# Patient Record
Sex: MALE | Race: WHITE | NOT HISPANIC OR LATINO | Employment: FULL TIME | URBAN - METROPOLITAN AREA
[De-identification: names, ages, dates, MRNs, and addresses within clinical notes are randomized per-mention and may not be internally consistent; named-entity substitution may affect disease eponyms.]

---

## 2017-05-11 ENCOUNTER — OFFICE VISIT (OUTPATIENT)
Dept: FAMILY MEDICINE | Facility: CLINIC | Age: 47
End: 2017-05-11
Payer: COMMERCIAL

## 2017-05-11 ENCOUNTER — RADIANT APPOINTMENT (OUTPATIENT)
Dept: GENERAL RADIOLOGY | Facility: CLINIC | Age: 47
End: 2017-05-11
Attending: NURSE PRACTITIONER
Payer: COMMERCIAL

## 2017-05-11 VITALS
OXYGEN SATURATION: 96 % | HEART RATE: 84 BPM | DIASTOLIC BLOOD PRESSURE: 73 MMHG | TEMPERATURE: 97.6 F | SYSTOLIC BLOOD PRESSURE: 108 MMHG | BODY MASS INDEX: 27.7 KG/M2 | WEIGHT: 209 LBS | HEIGHT: 73 IN

## 2017-05-11 DIAGNOSIS — M77.01 MEDIAL EPICONDYLITIS OF RIGHT ELBOW: ICD-10-CM

## 2017-05-11 DIAGNOSIS — M75.101 ROTATOR CUFF SYNDROME, RIGHT: Primary | ICD-10-CM

## 2017-05-11 PROCEDURE — 99203 OFFICE O/P NEW LOW 30 MIN: CPT | Performed by: NURSE PRACTITIONER

## 2017-05-11 PROCEDURE — 73030 X-RAY EXAM OF SHOULDER: CPT | Mod: RT

## 2017-05-11 RX ORDER — NAPROXEN 500 MG/1
500 TABLET ORAL 2 TIMES DAILY PRN
Qty: 30 TABLET | Refills: 1 | Status: SHIPPED | OUTPATIENT
Start: 2017-05-11 | End: 2017-07-07

## 2017-05-11 NOTE — LETTER
[address]  [place] [address]  [phone]                                                                                               Date: 5/12/2017    Rajiv Feng                                                                               [address]  [place] [address]              Dear Rajiv,    It looks on xray like a perfectly healthy shoulder   Enclosed is a copy of your results.      It was a pleasure to see you at your last appointment. If you have any questions, please feel free to call myself or my nurse at [phone].          Sincerely,    Mary Parish NP/ Irasema CHANG, CMA

## 2017-05-11 NOTE — MR AVS SNAPSHOT
After Visit Summary   5/11/2017    Rajiv Feng    MRN: [id number]           Patient Information     Date Of Birth          1970        Visit Information        Provider Department      5/11/2017 3:00 PM Mary Parish APRN CNP [facility] [place]        Today's Diagnoses     Rotator cuff syndrome, right    -  1    Medial epicondylitis of right elbow          Care Instructions    Begin naproxen 500mg twice a day with food for shoulder and elbow  Wear tennis elbow splint during the day  Rest elbow  Ice if painful    Schedule physical therapy for the right rotator cuff strain             Follow-ups after your visit        Additional Services     [facility] PT, HAND, AND CHIROPRACTIC REFERRAL       **This order will print in the [facility] Scheduling Office**    Physical Therapy, Hand Therapy and Chiropractic Care are available through:    *[facility]  *[facility]  *[facility]    Call one number to schedule at any of the above locations: [phone].    Your provider has referred you to: Physical Therapy at [facility] or [facility]    Indication/Reason for Referral: Shoulder Pain  Onset of Illness: 3 weeks  Therapy Orders: Evaluate and Treat  Special Programs: None  Special Request: None    Rinku Dobbs      Additional Comments for the Therapist or Chiropractor:     Please be aware that coverage of these services is subject to the terms and limitations of your health insurance plan.  Call member services at your health plan with any benefit or coverage questions.      Please bring the following to your appointment:    *Your personal calendar for scheduling future appointments  *Comfortable clothing                  Who to contact     If you have questions or need follow up information about today's clinic visit or your schedule please contact [facility] directly at [phone].  Normal or non-critical lab and imaging results will be communicated to  "you by MyChart, letter or phone within 4 business days after the clinic has received the results. If you do not hear from us within 7 days, please contact the clinic through [employer] or phone. If you have a critical or abnormal lab result, we will notify you by phone as soon as possible.  Submit refill requests through [employer] or call your pharmacy and they will forward the refill request to us. Please allow 3 business days for your refill to be completed.          Additional Information About Your Visit        [employer] Information     [employer] lets you send messages to your doctor, view your test results, renew your prescriptions, schedule appointments and more. To sign up, go to [email or web address] . Click on \"Log in\" on the left side of the screen, which will take you to the Welcome page. Then click on \"Sign up Now\" on the right side of the page.     You will be asked to enter the access code listed below, as well as some personal information. Please follow the directions to create your username and password.     Your access code is: [email or web address]  Expires: 2017  3:26 PM     Your access code will  in 90 days. If you need help or a new code, please call your [facility] clinic or [phone].        Care EveryWhere ID     This is your Care EveryWhere ID. This could be used by other organizations to access your [facility] medical records  [id number]        Your Vitals Were     Pulse Temperature Height Pulse Oximetry BMI (Body Mass Index)       84 97.6  F (36.4  C) (Oral) 6' 0.5\" (1.842 m) 96% 27.96 kg/m2        Blood Pressure from Last 3 Encounters:   17 108/73    Weight from Last 3 Encounters:   17 209 lb (94.8 kg)              We Performed the Following     [facility] PT, HAND, AND CHIROPRACTIC REFERRAL          Today's Medication Changes          These changes are accurate as of: 17  3:26 PM.  If you have any questions, ask your nurse or doctor.               Start taking these medicines.     "    Dose/Directions    naproxen 500 MG tablet   Commonly known as:  NAPROSYN   Used for:  Rotator cuff syndrome, right   Started by:  Mary Parish APRN CNP        Dose:  500 mg   Take 1 tablet (500 mg) by mouth 2 times daily as needed for moderate pain   Quantity:  30 tablet   Refills:  1            Where to get your medicines      These medications were sent to [id number] - [place], [place] - [address]  [address], [place] [address]     Phone:  [phone]     naproxen 500 MG tablet                Primary Care Provider    None Specified       No primary provider on file.        Thank you!     Thank you for choosing [facility]  for your care. Our goal is always to provide you with excellent care. Hearing back from our patients is one way we can continue to improve our services. Please take a few minutes to complete the written survey that you may receive in the mail after your visit with us. Thank you!             Your Updated Medication List - Protect others around you: Learn how to safely use, store and throw away your medicines at www.disposemymeds.org.          This list is accurate as of: 5/11/17  3:26 PM.  Always use your most recent med list.                   Brand Name Dispense Instructions for use    FINASTERIDE PO          naproxen 500 MG tablet    NAPROSYN    30 tablet    Take 1 tablet (500 mg) by mouth 2 times daily as needed for moderate pain

## 2017-05-11 NOTE — NURSING NOTE
"Chief Complaint   Patient presents with     Shoulder Pain       Initial /73 (BP Location: Right arm, Cuff Size: Adult Large)  Pulse 84  Temp 97.6  F (36.4  C) (Oral)  Ht 6' 0.5\" (1.842 m)  Wt 209 lb (94.8 kg)  SpO2 96%  BMI 27.96 kg/m2 Estimated body mass index is 27.96 kg/(m^2) as calculated from the following:    Height as of this encounter: 6' 0.5\" (1.842 m).    Weight as of this encounter: 209 lb (94.8 kg).  Medication Reconciliation: complete     Jailyn Virgen MA    "

## 2017-05-11 NOTE — PROGRESS NOTES
SUBJECTIVE:                                                    Rajiv Feng is a 46 year old male who presents to clinic today for the following health issues:      Musculoskeletal problem/pain      Duration: 3 weeks of right shoulder pain with certain overhead movements, no known trauma, has seen chiro who dx soft tissue injury and athletic taped shoulder. Description  Location: right shoulder and elbow    Intensity:  moderate    Accompanying signs and symptoms: none    History  Previous similar problem: no   Previous evaluation:  none    Precipitating or alleviating factors:  Trauma or overuse: YES- uses mouse a lot; started working out about 6 weeks ago    Aggravating factors include: lifting and rolling over in bed    Therapies tried and outcome: support wrap     Also reports right medial elbow pain with lifting heavy objects    Problem list and histories reviewed & adjusted, as indicated.  Additional history: as documented    There is no problem list on file for this patient.    History reviewed. No pertinent surgical history.    Social History   Substance Use Topics     Smoking status: Never Smoker     Smokeless tobacco: Never Used     Alcohol use No     History reviewed. No pertinent family history.      Current Outpatient Prescriptions   Medication Sig Dispense Refill     FINASTERIDE PO        naproxen (NAPROSYN) 500 MG tablet Take 1 tablet (500 mg) by mouth 2 times daily as needed for moderate pain 30 tablet 1     Allergies   Allergen Reactions     Broccoli [Brassica Oleracea Italica] Anaphylaxis     Penicillin G Anaphylaxis       Reviewed and updated as needed this visit by clinical staff       Reviewed and updated as needed this visit by Provider         ROS:  Constitutional, HEENT, cardiovascular, pulmonary, gi and gu systems are negative, except as otherwise noted.    OBJECTIVE:                                                    /73 (BP Location: Right arm, Cuff Size: Adult Large)  Pulse 84   "Temp 97.6  F (36.4  C) (Oral)  Ht 6' 0.5\" (1.842 m)  Wt 209 lb (94.8 kg)  SpO2 96%  BMI 27.96 kg/m2  Body mass index is 27.96 kg/(m^2).  GENERAL: healthy, alert and no distress  EYES: Eyes grossly normal to inspection, PERRL and conjunctivae and sclerae normal  NECK: no adenopathy, no asymmetry, masses, or scars and thyroid normal to palpation  MS: no gross musculoskeletal defects noted, no edema, no limitation to active ROM of shoulder, minimal pain of posterior AC with internal rotation , negative empty can and scarf signs, no impingement, good symmetric strength of arms, no tenderness of medial elbow at this time     BACK: no CVA tenderness, no paralumbar tenderness  PSYCH: mentation appears normal, affect normal/bright    Diagnostic Test Results:  Xray right shoulder     ASSESSMENT/PLAN:                                                          ICD-10-CM    1. Rotator cuff syndrome, right M75.101 naproxen (NAPROSYN) 500 MG tablet     [facility] PT, HAND, AND CHIROPRACTIC REFERRAL   2. Medial epicondylitis of right elbow M77.01        Patient Instructions   Begin naproxen 500mg twice a day with food for shoulder and elbow  Wear tennis elbow splint during the day  Rest elbow  Ice if painful    Schedule physical therapy for the right rotator cuff strain           YOGESH [facility]  "

## 2017-05-11 NOTE — PATIENT INSTRUCTIONS
Begin naproxen 500mg twice a day with food for shoulder and elbow  Wear tennis elbow splint during the day  Rest elbow  Ice if painful    Schedule physical therapy for the right rotator cuff strain

## 2017-05-15 ENCOUNTER — THERAPY VISIT (OUTPATIENT)
Dept: PHYSICAL THERAPY | Facility: CLINIC | Age: 47
End: 2017-05-15
Payer: COMMERCIAL

## 2017-05-15 DIAGNOSIS — M25.511 ACUTE PAIN OF RIGHT SHOULDER: Primary | ICD-10-CM

## 2017-05-15 DIAGNOSIS — M75.101 ROTATOR CUFF SYNDROME, RIGHT: ICD-10-CM

## 2017-05-15 PROCEDURE — 97110 THERAPEUTIC EXERCISES: CPT | Mod: GP | Performed by: PHYSICAL THERAPIST

## 2017-05-15 PROCEDURE — 97112 NEUROMUSCULAR REEDUCATION: CPT | Mod: GP | Performed by: PHYSICAL THERAPIST

## 2017-05-15 PROCEDURE — 97161 PT EVAL LOW COMPLEX 20 MIN: CPT | Mod: GP | Performed by: PHYSICAL THERAPIST

## 2017-05-15 NOTE — PROGRESS NOTES
Subjective:    HPI                    Objective:    System    Physical Exam    [facility]  [facility] Initial Evaluation - Upper Extremity    Evaluation Date: May 15, 2017  Rajiv Feng is a 46 year old male with a Rt shld condition.   Referral: GP  Employment:    Employment status: normal hours  Work Mechanical Stresses: Driving, sitting, computer  Leisure Mechanical Stresses: 2-3 x/week, wt lifting running/elliptical and swimming  Functional disability from present episode: sleeping on Rt, carrying, lifting and reaching overhead  Functional disability score (SPADI): 30  Visual Analog Score (VAS 0-10): ranges 0-8/10, 1-2/10 currently    HISTORY:  Pt presents with:  Int pain sharp pain ant/post Rt shld and Int achy/sharp Rt medial elbow  (Constant/Intermittent, Dull/achy/sharp/stabbing/throbbing and location/Radiation)  Associated symptoms (Numbness/tingling/weakness/swelling/catching/clicking/locking/subluxing):  denies N&T, occas clicking/popping in rt shld  Onset of symptoms (date/how):  4 weeks ago - JUAN for onset  Symptoms Status (new/recurrent/chronic): new and (improving/not changing/worsening): slight better  Condition occurred in the following environment: unknown   Symptoms at onset: Shld pain    (with consideration for bending, sitting, turning neck, dressing, reaching, gripping, am, as the day progresses, pm, when still, on the move, sleeping: prone, sup, side R/L, other )  Symptoms are made worse with:  Lying on Rt shld, lifting heavier objects, reaching overhead and  Back, resting w/ pressure on elbow, Occas with computer work, occas w/ prolonged sitting at home,    Symptoms are made better with:  Change position or stop activity    Continued use makes pain:  unchanged; Sleep disturbance: wakes 1 x/night ;   Pain at rest:  occas: Site:  Rt shld    Previous episodes: not for shld,   Previous treatments: chiro taped shld  Improvement with previous treatments: some relief      Specific Questions: (as reported by the patient)  Do you have pain with coughing/sneezing: no  Is gait and coordination of upper extremities normal or abnormal: normal  Patient describes his/her general health as: good  Imaging/special testing: x-ray - normal  Recent or major surgery includes the following: wisdom teeth  Do you have night pain: pain wakes him but he can repostition and go back to sleep  Have you had any recent accidents: no  Have you experienced any unexplained weight loss: no  Pertinent medical history includes: Sleep Disorder/apnea  Medical allergies include: PCN  Current medications: anti inflammatory  Barriers at home: none  Other red flags: none    OBJECTIVE EXAMINATION:  Rt shld (site)    POSTURE:  Sitting: poor  Correction of Posture: slight decr but did not abolish  Standing Posture: Good    NEUROLOGICAL (motor/sensory/reflex/dural; if applicable):    Myotomes:  See resisted movements below    BASELINES: (pain or functional activity): ERP Rt shld flex and Abd - Active and passive.      EXTREMITIES: (Shoulder / Elbow / Wrist / Hand): Rt shld   Rt AROM/Pain Lt AROM/Pain Rt PROM w/ OP/Pain Lt PROM w/ w/o OP/Pain   Shld Flexion WNL/PDM  WNL/ERP            Extension WNL/PDM  WNL/ERP            Abduction WNL/ERP  WNL/ERP            Adduction               IR WNL/PDM  WNL            ER WNL  WNL/ERP    Elbow Flexion WNL  WNL               Extension WNL  WNL    Wrist Flexion                Extension                Pronation                Supination                UD                RD           Resisted Test Response (pain):    Rt Pain Lt Pain   Shld Flexion Strong              Extension Strong              Abduction Strong              Adduction Strong + shld             IR Strong + shld             ER Strong      Elbow Flexion Strong + elbow                Extension Strong      Wrist Flexion Strong               Extension Strong               Pronation Strong               Supination Strong     "  Finger Extension Strong                        Other Tests:   SPINE  Cervical AROM:  Movement Loss % Loss  Pain   Protrusion 0%    Flexion 0%    Retraction 25% Post shd   Extension 50% Post shld   Lateral flexion R 0%    Lateral flexion L 0%    Rotation R 0%    Rotation L 0%    Effect of repeated movements:    Seated Cerv Retraction x10 - NE, NE, NE   Seated Cerv Retraction w/ OP x10 - NE, NE,NE   Seated Cerv Extension x10 - NE, NE, Decr pain w/ shld ROM     Effect of static positioning:    Sustained Cerv Extension in VIRY 30\" x3 and 60\" x1  - NE, NE, decr pain w/ Shld AROM  Spine testing (relevant/not relevant/secondary problem): Relevant ???    Baseline Symptoms: NA - will r/o neck then reassess shld  Repeated Tests Symptom Response Mechanical Response   Active/Passive movement, resisted test, functional test During - Produce, Abolish, Increase, Decrease, NE After - Better, Worse, NB, NW, NE Effect - ? or ? ROM, strength or key functional test No Effect                               Effect of static positioning                  Provisional Classification: Cerv Derangement:  Unilat, asymmetrical above elbow Extremity or Spine: Spine  Principle of Management (education/equipment/mechanical therapy/specific principle): Posture correction:  Neutral spine and use of L-roll   Extension:  Seated cervical ext 6-10x 4-5 x/day and Sustained ext VIRY 30-60\" 2-3 x/day 2-4x    Assessment/Plan:      Patient is a 46 year old male with right side shoulder complaints.    Patient has the following significant findings with corresponding treatment plan.                Diagnosis 1:  Rt shld pain/RC Syndrome  Pain -  hot/cold therapy, manual therapy, education, directional preference exercise and home program  Decreased ROM/flexibility - manual therapy, therapeutic exercise and home program  Decreased function - therapeutic activities and home program  Impaired posture - neuro re-education and home program    Therapy Evaluation Codes: "   1) History comprised of:   Personal factors that impact the plan of care:      None.    Comorbidity factors that impact the plan of care are:      Sleep disorder/apnea.     Medications impacting care: Anti-inflammatory.  2) Examination of Body Systems comprised of:   Body structures and functions that impact the plan of care:      Shoulder.   Activity limitations that impact the plan of care are:      Dressing, Lifting, Reading/Computer work, Sitting and Sleeping.  3) Clinical presentation characteristics are:   Stable/Uncomplicated.  4) Decision-Making    Low complexity using standardized patient assessment instrument and/or measureable assessment of functional outcome.  Cumulative Therapy Evaluation is: Low complexity.    Previous and current functional limitations:  (See Goal Flow Sheet for this information)    Short term and Long term goals: (See Goal Flow Sheet for this information)     Communication ability:  Patient appears to be able to clearly communicate and understand verbal and written communication and follow directions correctly.  Treatment Explanation - The following has been discussed with the patient:   RX ordered/plan of care  Anticipated outcomes  Possible risks and side effects  This patient would benefit from PT intervention to resume normal activities.   Rehab potential is good.    Frequency:  1 X week, once daily  Duration:  for 6-8 weeks  Discharge Plan:  Achieve all LTG.  Independent in home treatment program.  Reach maximal therapeutic benefit.    Please refer to the daily flowsheet for treatment today, total treatment time and time spent performing 1:1 timed codes.

## 2017-05-22 ENCOUNTER — THERAPY VISIT (OUTPATIENT)
Dept: PHYSICAL THERAPY | Facility: CLINIC | Age: 47
End: 2017-05-22
Payer: COMMERCIAL

## 2017-05-22 DIAGNOSIS — M75.101 ROTATOR CUFF SYNDROME, RIGHT: ICD-10-CM

## 2017-05-22 DIAGNOSIS — M25.511 ACUTE PAIN OF RIGHT SHOULDER: ICD-10-CM

## 2017-05-22 PROCEDURE — 97110 THERAPEUTIC EXERCISES: CPT | Mod: GP | Performed by: PHYSICAL THERAPY ASSISTANT

## 2017-05-22 PROCEDURE — 97112 NEUROMUSCULAR REEDUCATION: CPT | Mod: GP | Performed by: PHYSICAL THERAPY ASSISTANT

## 2017-06-01 ENCOUNTER — THERAPY VISIT (OUTPATIENT)
Dept: PHYSICAL THERAPY | Facility: CLINIC | Age: 47
End: 2017-06-01
Payer: COMMERCIAL

## 2017-06-01 DIAGNOSIS — M25.511 ACUTE PAIN OF RIGHT SHOULDER: ICD-10-CM

## 2017-06-01 DIAGNOSIS — M75.101 ROTATOR CUFF SYNDROME, RIGHT: ICD-10-CM

## 2017-06-01 PROCEDURE — 97530 THERAPEUTIC ACTIVITIES: CPT | Mod: GP | Performed by: PHYSICAL THERAPY ASSISTANT

## 2017-06-01 PROCEDURE — 97110 THERAPEUTIC EXERCISES: CPT | Mod: GP | Performed by: PHYSICAL THERAPY ASSISTANT

## 2017-06-15 ENCOUNTER — THERAPY VISIT (OUTPATIENT)
Dept: PHYSICAL THERAPY | Facility: CLINIC | Age: 47
End: 2017-06-15
Payer: COMMERCIAL

## 2017-06-15 DIAGNOSIS — M25.511 ACUTE PAIN OF RIGHT SHOULDER: ICD-10-CM

## 2017-06-15 DIAGNOSIS — M75.101 ROTATOR CUFF SYNDROME, RIGHT: ICD-10-CM

## 2017-06-15 PROCEDURE — 97530 THERAPEUTIC ACTIVITIES: CPT | Mod: GP | Performed by: PHYSICAL THERAPIST

## 2017-06-15 PROCEDURE — 97110 THERAPEUTIC EXERCISES: CPT | Mod: GP | Performed by: PHYSICAL THERAPIST

## 2017-06-15 NOTE — PROGRESS NOTES
Subjective:    HPI                    Objective:    System    Physical Exam    General     ROS    Assessment/Plan:      PROGRESS  REPORT    Progress reporting period is from 5-15-17 to 6-15-17.       SUBJECTIVE  Subjective changes noted by patient:  Shld is 90-95% better.  Had pain last week with carrying ceramic tile but doing exer relieved pain.      Current Pain level: 0/10.     Initial Pain level: 2/10.   Changes in function:  Yes (See Goal flowsheet attached for changes in current functional level)  Adverse reaction to treatment or activity: None    OBJECTIVE  Changes noted in objective findings:  Yes, Incr ROM and strength Rt shld and neck  Rt shld AROM is WNL and painfree.    PROM w/ OP is WNL and Painfree.    Resisted Shld movements strong and painfree.    Cervical AROM is WNL and painfree.    Mild tightness remains CT junction.     ASSESSMENT/PLAN  Updated problem list and treatment plan: Diagnosis 1:  Rt Shld Pain/RC syndrome  Pain -  home program  Decreased ROM/flexibility - home program  Decreased function - home program  Impaired posture - home program  STG/LTGs have been met or progress has been made towards goals:  Yes (See Goal flow sheet completed today.)  Assessment of Progress: The patient has met all of their long term goals.  Self Management Plans:  Patient is independent in a home treatment program.  Patient is independent in self management of symptoms.    Rajiv continues to require the following intervention to meet STG and LTG's:  PT intervention is no longer required to meet STG/LTG.    Recommendations:  This patient is ready to be discharged from therapy and continue their home treatment program.    Please refer to the daily flowsheet for treatment today, total treatment time and time spent performing 1:1 timed codes.

## 2017-07-07 ENCOUNTER — OFFICE VISIT (OUTPATIENT)
Dept: FAMILY MEDICINE | Facility: CLINIC | Age: 47
End: 2017-07-07
Payer: COMMERCIAL

## 2017-07-07 VITALS
WEIGHT: 207.8 LBS | OXYGEN SATURATION: 97 % | SYSTOLIC BLOOD PRESSURE: 102 MMHG | TEMPERATURE: 98.9 F | DIASTOLIC BLOOD PRESSURE: 66 MMHG | BODY MASS INDEX: 27.54 KG/M2 | HEART RATE: 88 BPM | HEIGHT: 73 IN

## 2017-07-07 DIAGNOSIS — Z00.00 ROUTINE GENERAL MEDICAL EXAMINATION AT A HEALTH CARE FACILITY: Primary | ICD-10-CM

## 2017-07-07 DIAGNOSIS — G47.33 OSA (OBSTRUCTIVE SLEEP APNEA): ICD-10-CM

## 2017-07-07 DIAGNOSIS — Z12.5 SCREENING FOR PROSTATE CANCER: ICD-10-CM

## 2017-07-07 PROCEDURE — 99386 PREV VISIT NEW AGE 40-64: CPT | Performed by: INTERNAL MEDICINE

## 2017-07-07 NOTE — MR AVS SNAPSHOT
After Visit Summary   7/7/2017    Rajiv Feng    MRN: [id number]           Patient Information     Date Of Birth          1970        Visit Information        Provider Department      7/7/2017 4:00 PM Mars Bourgeois MD [facility]        Today's Diagnoses     Routine general medical examination at a health care facility    -  1    SVEN (obstructive sleep apnea)        Screening for prostate cancer          Care Instructions      Preventive Health Recommendations  Male Ages 40 to 49    Yearly exam:             See your health care provider every year in order to  o   Review health changes.   o   Discuss preventive care.    o   Review your medicines if your doctor has prescribed any.    You should be tested each year for STDs (sexually transmitted diseases) if you re at risk.     Have a cholesterol test every 5 years.     Have a colonoscopy (test for colon cancer) if someone in your family has had colon cancer or polyps before age 50.     After age 45, have a diabetes test (fasting glucose). If you are at risk for diabetes, you should have this test every 3 years.      Talk with your health care provider about whether or not a prostate cancer screening test (PSA) is right for you.    Shots: Get a flu shot each year. Get a tetanus shot every 10 years.     Nutrition:    Eat at least 5 servings of fruits and vegetables daily.     Eat whole-grain bread, whole-wheat pasta and brown rice instead of white grains and rice.     Talk to your provider about Calcium and Vitamin D.     Lifestyle    Exercise for at least 150 minutes a week (30 minutes a day, 5 days a week). This will help you control your weight and prevent disease.     Limit alcohol to one drink per day.     No smoking.     Wear sunscreen to prevent skin cancer.     See your dentist every six months for an exam and cleaning.              Follow-ups after your visit        Future tests that were ordered for you today     Open Future  "Orders        Priority Expected Expires Ordered    Comprehensive metabolic panel Routine  2018    Lipid Profile with reflex to direct LDL Routine  2018    CBC with platelets Routine  2018    TSH with free T4 reflex Routine  2018    PSA, screen Routine  2018            Who to contact     If you have questions or need follow up information about today's clinic visit or your schedule please contact [facility] directly at [phone].  Normal or non-critical lab and imaging results will be communicated to you by [employer], letter or phone within 4 business days after the clinic has received the results. If you do not hear from us within 7 days, please contact the clinic through [employer] or phone. If you have a critical or abnormal lab result, we will notify you by phone as soon as possible.  Submit refill requests through [employer] or call your pharmacy and they will forward the refill request to us. Please allow 3 business days for your refill to be completed.          Additional Information About Your Visit        [employer] Information     [employer] lets you send messages to your doctor, view your test results, renew your prescriptions, schedule appointments and more. To sign up, go to [email or web address] . Click on \"Log in\" on the left side of the screen, which will take you to the Welcome page. Then click on \"Sign up Now\" on the right side of the page.     You will be asked to enter the access code listed below, as well as some personal information. Please follow the directions to create your username and password.     Your access code is: [email or web address]  Expires: 2017  3:26 PM     Your access code will  in 90 days. If you need help or a new code, please call your [facility] or [phone].        Care EveryWhere ID     This is your Care EveryWhere ID. This could be used by other organizations to access your [facility] medical " "records  [id number]        Your Vitals Were     Pulse Temperature Height Pulse Oximetry BMI (Body Mass Index)       88 98.9  F (37.2  C) (Oral) 6' 0.5\" (1.842 m) 97% 27.8 kg/m2        Blood Pressure from Last 3 Encounters:   07/07/17 102/66   05/11/17 108/73    Weight from Last 3 Encounters:   07/07/17 207 lb 12.8 oz (94.3 kg)   05/11/17 209 lb (94.8 kg)              We Performed the Following     [employer] ACCESS CODE - Add PCP and Click on cosign on right        Primary Care Provider Office Phone # Fax #    Mars Bourgeois [phone]       [facility] [address]  [facility] [address]        Equal Access to Services     ANTONIETA ZARAGOZA : Hadii momo ku hadasho Soomaali, waaxda luqadaha, qaybta kaalmada adeegyada, arlene pate hayfina roberson . So [facility] [phone].    ATENCIÓN: Si habla español, tiene a kinsey disposición servicios gratuitos de asistencia lingüística. Aroldo al [phone].    We comply with applicable federal civil rights laws and [place] laws. We do not discriminate on the basis of race, color, national origin, age, disability sex, sexual orientation or gender identity.            Thank you!     Thank you for choosing [facility]  for your care. Our goal is always to provide you with excellent care. Hearing back from our patients is one way we can continue to improve our services. Please take a few minutes to complete the written survey that you may receive in the mail after your visit with us. Thank you!             Your Updated Medication List - Protect others around you: Learn how to safely use, store and throw away your medicines at www.disposemymeds.org.          This list is accurate as of: 7/7/17  4:26 PM.  Always use your most recent med list.                   Brand Name Dispense Instructions for use Diagnosis    FINASTERIDE PO             "

## 2017-07-07 NOTE — NURSING NOTE
"Chief Complaint   Patient presents with     Physical       Initial /66 (BP Location: Right arm, Patient Position: Chair, Cuff Size: Adult Large)  Pulse 88  Temp 98.9  F (37.2  C) (Oral)  Ht 6' 0.5\" (1.842 m)  Wt 207 lb 12.8 oz (94.3 kg)  SpO2 97%  BMI 27.8 kg/m2 Estimated body mass index is 27.8 kg/(m^2) as calculated from the following:    Height as of this encounter: 6' 0.5\" (1.842 m).    Weight as of this encounter: 207 lb 12.8 oz (94.3 kg).  Medication Reconciliation: complete   Jennifer Grier MA  "

## 2017-07-07 NOTE — PROGRESS NOTES
SUBJECTIVE:   CC: Rajiv Feng is an 47 year old male who presents for preventative health visit.     Healthy Habits:    Do you get at least three servings of calcium containing foods daily (dairy, green leafy vegetables, etc.)? 1    Amount of exercise or daily activities, outside of work: walks at work, mows the lawn 1 x week    Problems taking medications regularly No    Medication side effects: No    Have you had an eye exam in the past two years? Is due now    Do you see a dentist twice per year? once    Do you have sleep apnea, excessive snoring or daytime drowsiness?Yes, has CPAP            Today's PHQ-2 Score:   PHQ-2 ( 1999 Pfizer) 7/7/2017   Q1: Little interest or pleasure in doing things 0   Q2: Feeling down, depressed or hopeless 0   PHQ-2 Score 0       Abuse: Current or Past(Physical, Sexual or Emotional)- No  Do you feel safe in your environment - Yes    Social History   Substance Use Topics     Smoking status: Never Smoker     Smokeless tobacco: Never Used     Alcohol use No     The patient does not drink >3 drinks per day nor >7 drinks per week.    Last PSA: No results found for: PSA    Reviewed orders with patient. Reviewed health maintenance and updated orders accordingly - Yes  Patient Active Problem List   Diagnosis     SVEN (obstructive sleep apnea)     Past Surgical History:   Procedure Laterality Date     NO HISTORY OF SURGERY         Social History   Substance Use Topics     Smoking status: Never Smoker     Smokeless tobacco: Never Used     Alcohol use No     Family History   Problem Relation Age of Onset     Vascular Disease Mother      smoker      Thyroid Disease Mother      Lung Cancer Father          Current Outpatient Prescriptions   Medication Sig Dispense Refill     FINASTERIDE PO        Allergies   Allergen Reactions     Broccoli [Brassica Oleracea Italica] Anaphylaxis     Penicillin G Anaphylaxis       Reviewed and updated as needed this visit by clinical staff  Tobacco  Allergies   "Meds  Soc Hx        Reviewed and updated as needed this visit by Provider        Past Medical History:   Diagnosis Date     SVEN (obstructive sleep apnea) 7/7/2017      Past Surgical History:   Procedure Laterality Date     NO HISTORY OF SURGERY         ROS:    Dry cracking skin on finger tips during the winter months  Remainder of 12 organs system ROS negative     OBJECTIVE:   /66 (BP Location: Right arm, Patient Position: Chair, Cuff Size: Adult Large)  Pulse 88  Temp 98.9  F (37.2  C) (Oral)  Ht 6' 0.5\" (1.842 m)  Wt 207 lb 12.8 oz (94.3 kg)  SpO2 97%  BMI 27.8 kg/m2  EXAM:  GENERAL: healthy, alert and no distress  EYES: Eyes grossly normal to inspection, PERRL and conjunctivae and sclerae normal  HENT: ear canals and TM's normal, nose and mouth without ulcers or lesions  NECK: no adenopathy, no asymmetry, masses, or scars and thyroid normal to palpation  RESP: lungs clear to auscultation - no rales, rhonchi or wheezes  CV: regular rate and rhythm, normal S1 S2, no S3 or S4, no murmur, click or rub, no peripheral edema and peripheral pulses strong  ABDOMEN: soft, nontender, no hepatosplenomegaly, no masses and bowel sounds normal  RECTAL: normal sphincter tone, no rectal masses, prostate normal size, smooth, nontender without nodules or masses  MS: no gross musculoskeletal defects noted, no edema  SKIN: no suspicious lesions or rashes  NEURO: Normal strength and tone, mentation intact and speech normal  PSYCH: mentation appears normal, affect normal/bright    ASSESSMENT/PLAN:   1. Routine general medical examination at a health care facility    - Comprehensive metabolic panel; Future  - Lipid Profile with reflex to direct LDL; Future  - CBC with platelets; Future  - TSH with free T4 reflex; Future (due dry skin problems and family hx )    2. SVEN (obstructive sleep apnea)  Continue CPAP    3. Screening for prostate cancer    - PSA, screen; Future    COUNSELING:  Reviewed preventive health counseling, " "as reflected in patient instructions  Special attention given to:        Regular exercise       Healthy diet/nutrition       Prostate cancer screening         reports that he has never smoked. He has never used smokeless tobacco.    Estimated body mass index is 27.8 kg/(m^2) as calculated from the following:    Height as of this encounter: 6' 0.5\" (1.842 m).    Weight as of this encounter: 207 lb 12.8 oz (94.3 kg).   Weight management plan: Discussed healthy diet and exercise guidelines and patient will follow up in 12 months in clinic to re-evaluate.    Counseling Resources:  ATP IV Guidelines  Pooled Cohorts Equation Calculator  FRAX Risk Assessment  ICSI Preventive Guidelines  Dietary Guidelines for Americans, 2010  USDA's MyPlate  ASA Prophylaxis  Lung CA Screening    Mars Bourgeois MD  [facility]  "

## 2018-02-06 ENCOUNTER — OFFICE VISIT (OUTPATIENT)
Dept: URGENT CARE | Facility: URGENT CARE | Age: 48
End: 2018-02-06
Payer: COMMERCIAL

## 2018-02-06 VITALS
BODY MASS INDEX: 28.16 KG/M2 | DIASTOLIC BLOOD PRESSURE: 73 MMHG | SYSTOLIC BLOOD PRESSURE: 116 MMHG | WEIGHT: 210.56 LBS | RESPIRATION RATE: 16 BRPM | TEMPERATURE: 98.6 F | HEART RATE: 78 BPM | OXYGEN SATURATION: 97 %

## 2018-02-06 DIAGNOSIS — Z86.19 H/O COLD SORES: ICD-10-CM

## 2018-02-06 DIAGNOSIS — J01.90 ACUTE SINUSITIS WITH COEXISTING CONDITION, NEED PROPHYLACTIC TREATMENT: Primary | ICD-10-CM

## 2018-02-06 DIAGNOSIS — R05.8 PRODUCTIVE COUGH: ICD-10-CM

## 2018-02-06 PROCEDURE — 99214 OFFICE O/P EST MOD 30 MIN: CPT | Performed by: PHYSICIAN ASSISTANT

## 2018-02-06 RX ORDER — VALACYCLOVIR HYDROCHLORIDE 1 G/1
2000 TABLET, FILM COATED ORAL 2 TIMES DAILY
Qty: 4 TABLET | Refills: 1 | Status: SHIPPED | OUTPATIENT
Start: 2018-02-06 | End: 2020-12-18

## 2018-02-06 RX ORDER — MOMETASONE FUROATE MONOHYDRATE 50 UG/1
2 SPRAY, METERED NASAL DAILY
Qty: 1 BOX | Refills: 0 | Status: SHIPPED | OUTPATIENT
Start: 2018-02-06 | End: 2018-03-10

## 2018-02-06 RX ORDER — AZITHROMYCIN 250 MG/1
TABLET, FILM COATED ORAL
Qty: 6 TABLET | Refills: 0 | Status: SHIPPED | OUTPATIENT
Start: 2018-02-06 | End: 2018-05-01

## 2018-02-06 NOTE — MR AVS SNAPSHOT
"              After Visit Summary   2/6/2018    Rajiv Feng    MRN: [id number]           Patient Information     Date Of Birth          1970        Visit Information        Provider Department      2/6/2018 5:20 PM Lavern Travis PA-C [facility]        Today's Diagnoses     Acute sinusitis with coexisting condition, need prophylactic treatment    -  1    Productive cough        H/O cold sores          Care Instructions    (J01.90) Acute sinusitis with coexisting condition, need prophylactic treatment  (primary encounter diagnosis)  Comment:   Plan: mometasone (NASONEX) 50 MCG/ACT spray            (R05) Productive cough  Comment:   Plan: azithromycin (ZITHROMAX Z-KENDALL) 250 MG tablet            (Z86.19) H/O cold sores  Comment:   Plan: valACYclovir (VALTREX) 1000 mg tablet            Follow up with primary clinic should symptoms persist or worsen.                Follow-ups after your visit        Who to contact     If you have questions or need follow up information about today's clinic visit or your schedule please contact [facility] directly at [phone].  Normal or non-critical lab and imaging results will be communicated to you by Cell[employer], letter or phone within 4 business days after the clinic has received the results. If you do not hear from us within 7 days, please contact the clinic through [employer] or phone. If you have a critical or abnormal lab result, we will notify you by phone as soon as possible.  Submit refill requests through [employer] or call your pharmacy and they will forward the refill request to us. Please allow 3 business days for your refill to be completed.          Additional Information About Your Visit        [employer] Information     [employer] lets you send messages to your doctor, view your test results, renew your prescriptions, schedule appointments and more. To sign up, go to [email or web address] . Click on \"Log " "in\" on the left side of the screen, which will take you to the Welcome page. Then click on \"Sign up Now\" on the right side of the page.     You will be asked to enter the access code listed below, as well as some personal information. Please follow the directions to create your username and password.     Your access code is: [email or web address]  Expires: 2018  6:50 PM     Your access code will  in 90 days. If you need help or a new code, please call your [facility] clinic or [phone].        Care EveryWhere ID     This is your Care EveryWhere ID. This could be used by other organizations to access your [facility] medical records  [id number]        Your Vitals Were     Pulse Temperature Respirations Pulse Oximetry BMI (Body Mass Index)       78 98.6  F (37  C) (Oral) 16 97% 28.16 kg/m2        Blood Pressure from Last 3 Encounters:   18 116/73   17 102/66   17 108/73    Weight from Last 3 Encounters:   18 210 lb 9 oz (95.5 kg)   17 207 lb 12.8 oz (94.3 kg)   17 209 lb (94.8 kg)              Today, you had the following     No orders found for display         Today's Medication Changes          These changes are accurate as of 18  6:50 PM.  If you have any questions, ask your nurse or doctor.               Start taking these medicines.        Dose/Directions    azithromycin 250 MG tablet   Commonly known as:  ZITHROMAX Z-KENDALL   Used for:  Productive cough   Started by:  Lavern Travis PA-C        two tablets first day and 1 tablet daily for 4 days   Quantity:  6 tablet   Refills:  0       mometasone 50 MCG/ACT spray   Commonly known as:  NASONEX   Used for:  Acute sinusitis with coexisting condition, need prophylactic treatment   Started by:  Lavern Travis PA-C        Dose:  2 spray   Spray 2 sprays into both nostrils daily   Quantity:  1 Box   Refills:  0       valACYclovir 1000 mg tablet   Commonly known as:  VALTREX   Used for:  H/O cold sores "   Started by:  Lavern Travis PA-C        Dose:  2000 mg   Take 2 tablets (2,000 mg) by mouth 2 times daily   Quantity:  4 tablet   Refills:  1            Where to get your medicines      These medications were sent to [facility]/pharmacy #[id number] - [place], [place] - [address]  [address] [facility] [place] [address]     Phone:  [phone]     azithromycin 250 MG tablet    mometasone 50 MCG/ACT spray    valACYclovir 1000 mg tablet                Primary Care Provider Office Phone # Fax #    Mars Bourgeois [phone]       [facility] [address]  [place] [address]        Equal Access to Services     [facility] Center[facility] : Hadii momo villalba hadasho Soanna, waaxda luqadaha, qaybta kaalmada adeegyada, waxay aylinin hayfina roberson . So [facility] [phone].    ATENCIÓN: Si habla español, tiene a kinsey disposición servicios gratuitos de asistencia lingüística. [facility] [phone].    We comply with applicable federal civil rights laws and [place] laws. We do not discriminate on the basis of race, color, national origin, age, disability, sex, sexual orientation, or gender identity.            Thank you!     Thank you for choosing [facility]  for your care. Our goal is always to provide you with excellent care. Hearing back from our patients is one way we can continue to improve our services. Please take a few minutes to complete the written survey that you may receive in the mail after your visit with us. Thank you!             Your Updated Medication List - Protect others around you: Learn how to safely use, store and throw away your medicines at www.disposemymeds.org.          This list is accurate as of 2/6/18  6:50 PM.  Always use your most recent med list.                   Brand Name Dispense Instructions for use Diagnosis    azithromycin 250 MG tablet    ZITHROMAX Z-KENDALL    6 tablet    two tablets first day and 1 tablet daily for 4 days     Productive cough       DAYQUIL OR      Take by mouth as needed        FINASTERIDE PO           mometasone 50 MCG/ACT spray    NASONEX    1 Box    Spray 2 sprays into both nostrils daily    Acute sinusitis with coexisting condition, need prophylactic treatment       valACYclovir 1000 mg tablet    VALTREX    4 tablet    Take 2 tablets (2,000 mg) by mouth 2 times daily    H/O cold sores

## 2018-02-07 NOTE — PROGRESS NOTES
SUBJECTIVE:   Rajiv Feng is a 47 year old male presenting with a chief complaint of:  1) runny nose and congestion   2) productive cough   Onset of symptoms was 1 week(s) ago.  Symptoms improved for a day or two and then worsened again last night.    Course of illness is worsening.    Severity moderate  Current and Associated symptoms: as above  Denies any fevers or wheezing.    Treatment measures tried include dayquil.  Predisposing factors include none.  DID have a flu vaccination this season.    SH: here with his wife who I have seen in clinic previously    Past Medical History:   Diagnosis Date     SVEN (obstructive sleep apnea) 7/7/2017     Patient Active Problem List   Diagnosis     SVEN (obstructive sleep apnea)     Social History   Substance Use Topics     Smoking status: Never Smoker     Smokeless tobacco: Never Used     Alcohol use No       ROS:  CONSTITUTIONAL:NEGATIVE for fever, chills, change in weight  INTEGUMENTARY/SKIN: NEGATIVE for worrisome rashes, moles or lesions  EYES: NEGATIVE for vision changes or irritation  ENT/MOUTH: as per HPI  RESP:as per HPi  CV: NEGATIVE for chest pain, palpitations or peripheral edema  GI: NEGATIVE for nausea, abdominal pain, heartburn, or change in bowel habits  MUSCULOSKELETAL: NEGATIVE for significant arthralgias or myalgia    OBJECTIVE  :/73  Pulse 78  Temp 98.6  F (37  C) (Oral)  Resp 16  Wt 210 lb 9 oz (95.5 kg)  SpO2 97%  BMI 28.16 kg/m2  GENERAL APPEARANCE: healthy, alert and no distress  EYES: EOMI,  PERRL, conjunctiva clear  HENT: ear canals and TM's normal.  Nose and mouth without ulcers, erythema or lesions  NECK: supple, nontender, no lymphadenopathy  RESP: lungs clear to auscultation - no rales, rhonchi or wheezes  CV: regular rates and rhythm, normal S1 S2, no murmur noted  ABDOMEN:  soft, nontender, no HSM or masses and bowel sounds normal  NEURO: Normal strength and tone, sensory exam grossly normal,  normal speech and mentation  SKIN: no  suspicious lesions or rashes    J01.90) Acute sinusitis with coexisting condition, need prophylactic treatment  (primary encounter diagnosis)  Comment:   Plan: mometasone (NASONEX) 50 MCG/ACT spray            (R05) Productive cough  Comment:   Plan: azithromycin (ZITHROMAX Z-KENDALL) 250 MG tablet            (Z86.19) H/O cold sores  Comment:   Plan: valACYclovir (VALTREX) 1000 mg tablet            Follow up with primary clinic should symptoms persist or worsen.      Patient expresses understanding and agreement with the assessment and plan as above.

## 2018-02-07 NOTE — PATIENT INSTRUCTIONS
(J01.90) Acute sinusitis with coexisting condition, need prophylactic treatment  (primary encounter diagnosis)  Comment:   Plan: mometasone (NASONEX) 50 MCG/ACT spray            (R05) Productive cough  Comment:   Plan: azithromycin (ZITHROMAX Z-KENDALL) 250 MG tablet            (Z86.19) H/O cold sores  Comment:   Plan: valACYclovir (VALTREX) 1000 mg tablet            Follow up with primary clinic should symptoms persist or worsen.

## 2018-03-10 DIAGNOSIS — J01.90 ACUTE SINUSITIS WITH COEXISTING CONDITION, NEED PROPHYLACTIC TREATMENT: ICD-10-CM

## 2018-03-10 RX ORDER — MOMETASONE FUROATE MONOHYDRATE 50 UG/1
SPRAY, METERED NASAL
Qty: 1 BOX | Refills: 11 | Status: SHIPPED | OUTPATIENT
Start: 2018-03-10 | End: 2018-05-01

## 2018-03-30 DIAGNOSIS — Z12.5 SCREENING FOR PROSTATE CANCER: ICD-10-CM

## 2018-03-30 DIAGNOSIS — Z00.00 ROUTINE GENERAL MEDICAL EXAMINATION AT A HEALTH CARE FACILITY: ICD-10-CM

## 2018-03-30 LAB
ALBUMIN SERPL-MCNC: 4.1 G/DL (ref 3.4–5)
ALP SERPL-CCNC: 53 U/L (ref 40–150)
ALT SERPL W P-5'-P-CCNC: 40 U/L (ref 0–70)
ANION GAP SERPL CALCULATED.3IONS-SCNC: 5 MMOL/L (ref 3–14)
AST SERPL W P-5'-P-CCNC: 31 U/L (ref 0–45)
BILIRUB SERPL-MCNC: 0.5 MG/DL (ref 0.2–1.3)
BUN SERPL-MCNC: 11 MG/DL (ref 7–30)
CALCIUM SERPL-MCNC: 9 MG/DL (ref 8.5–10.1)
CHLORIDE SERPL-SCNC: 107 MMOL/L (ref 94–109)
CHOLEST SERPL-MCNC: 148 MG/DL
CO2 SERPL-SCNC: 27 MMOL/L (ref 20–32)
CREAT SERPL-MCNC: 1.01 MG/DL (ref 0.66–1.25)
ERYTHROCYTE [DISTWIDTH] IN BLOOD BY AUTOMATED COUNT: 13.1 % (ref 10–15)
GFR SERPL CREATININE-BSD FRML MDRD: 79 ML/MIN/1.7M2
GLUCOSE SERPL-MCNC: 85 MG/DL (ref 70–99)
HCT VFR BLD AUTO: 43.6 % (ref 40–53)
HDLC SERPL-MCNC: 38 MG/DL
HGB BLD-MCNC: 14.5 G/DL (ref 13.3–17.7)
LDLC SERPL CALC-MCNC: 96 MG/DL
MCH RBC QN AUTO: 29.7 PG (ref 26.5–33)
MCHC RBC AUTO-ENTMCNC: 33.3 G/DL (ref 31.5–36.5)
MCV RBC AUTO: 89 FL (ref 78–100)
NONHDLC SERPL-MCNC: 110 MG/DL
PLATELET # BLD AUTO: 165 10E9/L (ref 150–450)
POTASSIUM SERPL-SCNC: 3.7 MMOL/L (ref 3.4–5.3)
PROT SERPL-MCNC: 6.9 G/DL (ref 6.8–8.8)
PSA SERPL-ACNC: 0.63 UG/L (ref 0–4)
RBC # BLD AUTO: 4.88 10E12/L (ref 4.4–5.9)
SODIUM SERPL-SCNC: 139 MMOL/L (ref 133–144)
TRIGL SERPL-MCNC: 70 MG/DL
TSH SERPL DL<=0.005 MIU/L-ACNC: 1.42 MU/L (ref 0.4–4)
WBC # BLD AUTO: 5.3 10E9/L (ref 4–11)

## 2018-03-30 PROCEDURE — 84443 ASSAY THYROID STIM HORMONE: CPT | Performed by: INTERNAL MEDICINE

## 2018-03-30 PROCEDURE — G0103 PSA SCREENING: HCPCS | Performed by: INTERNAL MEDICINE

## 2018-03-30 PROCEDURE — 36415 COLL VENOUS BLD VENIPUNCTURE: CPT | Performed by: INTERNAL MEDICINE

## 2018-03-30 PROCEDURE — 80061 LIPID PANEL: CPT | Performed by: INTERNAL MEDICINE

## 2018-03-30 PROCEDURE — 85027 COMPLETE CBC AUTOMATED: CPT | Performed by: INTERNAL MEDICINE

## 2018-03-30 PROCEDURE — 80053 COMPREHEN METABOLIC PANEL: CPT | Performed by: INTERNAL MEDICINE

## 2018-03-30 NOTE — LETTER
"[place] [address] [facility]  [address]  [place]  [address]  Tel: [phone]    April 2, 2018    [address] [place] [address]      Dear  Jung,     The following letter pertains to your most recent diagnostic tests:    -Your prostate specific antigen (PSA) test result returned normal.     -Your cholesterol panel looks healthy with the exception of very mildly low good cholesterol (HDL).  Increasing exercise can improve HDL (\"good cholesterol\") levels.  A good target to shoot for is 30 minutes of aerobic activity at least 4 days per week.     -TSH (thyroid stimulating hormone) level is normal which indicates normal circulating thyroid hormone levels.      -Liver and gallbladder tests are normal for you. (ALT,AST, Alk phos, bilirubin), kidney function is normal for you (Creatinine, GFR), Sodium is normal, Potassium is normal for you, Calcium is normal for you, Glucose (blood sugar) is normal for you.      -Your complete blood counts including your hemoglobin returned normal for you.           Bottom line:  These lab results look acceptable.        Follow up:  You will be due for preventive exam in July of 2018      Sincerely,    Dr. Bourgeois / esa       Results for orders placed or performed in visit on 03/30/18   Comprehensive metabolic panel   Result Value Ref Range    Sodium 139 133 - 144 mmol/L    Potassium 3.7 3.4 - 5.3 mmol/L    Chloride 107 94 - 109 mmol/L    Carbon Dioxide 27 20 - 32 mmol/L    Anion Gap 5 3 - 14 mmol/L    Glucose 85 70 - 99 mg/dL    Urea Nitrogen 11 7 - 30 mg/dL    Creatinine 1.01 0.66 - 1.25 mg/dL    GFR Estimate 79 >60 mL/min/1.7m2    GFR Estimate If Black >90 >60 mL/min/1.7m2    Calcium 9.0 8.5 - 10.1 mg/dL    Bilirubin Total 0.5 0.2 - 1.3 mg/dL    Albumin 4.1 3.4 - 5.0 g/dL    Protein Total 6.9 6.8 - 8.8 g/dL    Alkaline Phosphatase 53 40 - 150 U/L    ALT 40 0 - 70 U/L    AST 31 0 - 45 U/L   Lipid Profile with reflex to direct LDL   Result Value Ref " Range    Cholesterol 148 <200 mg/dL    Triglycerides 70 <150 mg/dL    HDL Cholesterol 38 (L) >39 mg/dL    LDL Cholesterol Calculated 96 <100 mg/dL    Non HDL Cholesterol 110 <130 mg/dL   CBC with platelets   Result Value Ref Range    WBC 5.3 4.0 - 11.0 10e9/L    RBC Count 4.88 4.4 - 5.9 10e12/L    Hemoglobin 14.5 13.3 - 17.7 g/dL    Hematocrit 43.6 40.0 - 53.0 %    MCV 89 78 - 100 fl    MCH 29.7 26.5 - 33.0 pg    MCHC 33.3 31.5 - 36.5 g/dL    RDW 13.1 10.0 - 15.0 %    Platelet Count 165 150 - 450 10e9/L   TSH with free T4 reflex   Result Value Ref Range    TSH 1.42 0.40 - 4.00 mU/L   PSA, screen   Result Value Ref Range    PSA 0.63 0 - 4 ug/L

## 2018-04-02 NOTE — PROGRESS NOTES
"The following letter pertains to your most recent diagnostic tests:    -Your prostate specific antigen (PSA) test result returned normal.     -Your cholesterol panel looks healthy with the exception of very mildly low good cholesterol (HDL).  Increasing exercise can improve HDL (\"good cholesterol\") levels.  A good target to shoot for is 30 minutes of aerobic activity at least 4 days per week.     -TSH (thyroid stimulating hormone) level is normal which indicates normal circulating thyroid hormone levels.      -Liver and gallbladder tests are normal for you. (ALT,AST, Alk phos, bilirubin), kidney function is normal for you (Creatinine, GFR), Sodium is normal, Potassium is normal for you, Calcium is normal for you, Glucose (blood sugar) is normal for you.      -Your complete blood counts including your hemoglobin returned normal for you.           Bottom line:  These lab results look acceptable.        Follow up:  You will be due for preventive exam in July of 2018      Sincerely,    Dr. Bourgeois"

## 2018-05-01 ENCOUNTER — OFFICE VISIT (OUTPATIENT)
Dept: FAMILY MEDICINE | Facility: CLINIC | Age: 48
End: 2018-05-01
Payer: COMMERCIAL

## 2018-05-01 VITALS
HEART RATE: 79 BPM | RESPIRATION RATE: 16 BRPM | DIASTOLIC BLOOD PRESSURE: 77 MMHG | TEMPERATURE: 97.9 F | SYSTOLIC BLOOD PRESSURE: 118 MMHG | OXYGEN SATURATION: 100 %

## 2018-05-01 DIAGNOSIS — Z23 NEED FOR VACCINATION: ICD-10-CM

## 2018-05-01 DIAGNOSIS — Z86.19 H/O COLD SORES: ICD-10-CM

## 2018-05-01 DIAGNOSIS — Z71.84 TRAVEL ADVICE ENCOUNTER: Primary | ICD-10-CM

## 2018-05-01 PROCEDURE — 90686 IIV4 VACC NO PRSV 0.5 ML IM: CPT | Mod: GA | Performed by: NURSE PRACTITIONER

## 2018-05-01 PROCEDURE — 90707 MMR VACCINE SC: CPT | Mod: GA | Performed by: NURSE PRACTITIONER

## 2018-05-01 PROCEDURE — 90715 TDAP VACCINE 7 YRS/> IM: CPT | Mod: GA | Performed by: NURSE PRACTITIONER

## 2018-05-01 PROCEDURE — 90471 IMMUNIZATION ADMIN: CPT | Mod: GA | Performed by: NURSE PRACTITIONER

## 2018-05-01 PROCEDURE — 90472 IMMUNIZATION ADMIN EACH ADD: CPT | Mod: GA | Performed by: NURSE PRACTITIONER

## 2018-05-01 PROCEDURE — 99402 PREV MED CNSL INDIV APPRX 30: CPT | Mod: 25 | Performed by: NURSE PRACTITIONER

## 2018-05-01 RX ORDER — VALACYCLOVIR HYDROCHLORIDE 1 G/1
1000 TABLET, FILM COATED ORAL 2 TIMES DAILY
Qty: 20 TABLET | Refills: 0 | COMMUNITY
Start: 2018-05-01 | End: 2018-05-03

## 2018-05-01 RX ORDER — AZITHROMYCIN 500 MG/1
500 TABLET, FILM COATED ORAL DAILY
Qty: 3 TABLET | Refills: 0 | Status: SHIPPED | OUTPATIENT
Start: 2018-05-01 | End: 2018-05-04

## 2018-05-01 NOTE — PATIENT INSTRUCTIONS
Today May 1, 2018 you received the    Flu Vaccine    Tetanus (Tdap) Vaccine    MMR (Measles Mumps Rubella) Vaccine    Typhoid - Oral. This prescription has been faxed to your pharmacy, please take as directed.   .    These appointments can be made as a NURSE ONLY visit.    **It is very important for the vaccinations to be given on the scheduled day(s), this helps ensure you receive the full effectiveness of the vaccine.**    Please call [facility] with any questions [phone]    Thank you for visiting [facility]

## 2018-05-01 NOTE — PROGRESS NOTES
Nurse Note      Itinerary:  [place] and [facility]       Departure Date: 06/25/2018      Return Date: 07/08/2018      Length of Trip 2 weeks       Reason for Travel: Tourism           Urban or rural: both      Accommodations: Hotel        IMMUNIZATION HISTORY  Have you received any immunizations within the past 4 weeks?  No  Have you ever fainted from having your blood drawn or from an injection?  No  Have you ever had a fever reaction to vaccination?  No  Have you ever had any bad reaction or side effect from any vaccination?  No  Have you ever had hepatitis A or B vaccine?  Yes  Do you live (or work closely) with anyone who has AIDS, an AIDS-like condition, any other immune disorder or who is on chemotherapy for cancer?  No  Do you have a family history of immunodeficiency?  No  Have you received any injection of immune globulin or any blood products during the past 12 months?  No    Patient roomed by PAULETTE Linares  Rajiv Feng is a 47 year old male seen today with spouse for counsultation for international travel to [place] and [facility] for Tourism.  Patient will be departing in  7 week(s) and staying for   2 week(s) and  traveling with spouse.      Patient itinerary :  will be in the [facility] region of  [place] and [facility] region which presents risk for Japanese Encephalitis and motor vehicle accidents. exposure.      Patient's activities will include sightseeing.    Patient[place] country of birth is [place]  Special medical concerns: cold sores and sleep apnea  Pre-travel questionnaire was completed by patient and reviewed by provider.     Vitals: /77  Pulse 79  Temp 97.9  F (36.6  C) (Oral)  Resp 16  SpO2 100%  BMI= There is no height or weight on file to calculate BMI.    EXAM:  General:  Well-nourished, well-developed in no acute distress.  Appears to be stated age, interacts appropriately and expresses understanding of information given to patient.    Current Outpatient Prescriptions    Medication Sig Dispense Refill     typhoid (VIVOTIF) CR capsule Take 1 capsule by mouth every other day 4 capsule 0     FINASTERIDE PO        valACYclovir (VALTREX) 1000 mg tablet Take 2 tablets (2,000 mg) by mouth 2 times daily 4 tablet 1     Patient Active Problem List   Diagnosis     SVEN (obstructive sleep apnea)     Allergies   Allergen Reactions     Broccoli [Brassica Oleracea Italica] Anaphylaxis     Penicillin G Anaphylaxis         Immunizations discussed include:   Hepatitis A:  Up to date  Hepatitis B: Up to date  Influenza: Ordered/given today, risks, benefits and side effects reviewed  Typhoid: Oral Typhoid (Vivotiff) Rx sent to pharmacy  Rabies: Not indicated  Yellow Fever: Not indicated  Japanese Encephalitis: Not indicated - risk of disease is minimal urban trav  Meningococcus: Not indicated  Tetanus/Diphtheria: Ordered/given today, risks, benefits and side effects reviewed  Measles/Mumps/Rubella: Ordered/given today  Cholera: Not needed  Polio: Up to date  Pneumococcal: Under age of 65  Varicella: Immune by disease history per patient report  Zostavax:  Not indicated  Shingrix: Not indicated  HPV:  Not indicated  TB:  Low risk     Altitude Exposure on this trip: no  Past tolerance to Altitude: na    ASSESSMENT/PLAN:    ICD-10-CM    1. Travel advice encounter Z71.89 typhoid (VIVOTIF) CR capsule     HC FLU VAC PRESRV FREE QUAD SPLIT VIR 3+YRS IM     MMR VIRUS IMMUNIZATION, SUBCUT     TDAP VACCINE (ADACEL)     azithromycin (ZITHROMAX) 500 MG tablet     VACCINE ADMINISTRATION, EACH ADDITIONAL     VACCINE ADMINISTRATION, INITIAL   2. Need for vaccination Z23 typhoid (VIVOTIF) CR capsule     HC FLU VAC PRESRV FREE QUAD SPLIT VIR 3+YRS IM     MMR VIRUS IMMUNIZATION, SUBCUT     TDAP VACCINE (ADACEL)     VACCINE ADMINISTRATION, EACH ADDITIONAL     VACCINE ADMINISTRATION, INITIAL   3. H/O cold sores Z86.19 DISCONTINUED: valACYclovir (VALTREX) 1000 mg tablet     I have reviewed general recommendations for  safe travel   including: food/water precautions, insect precautions, roadway safety. Educational materials and Travax report provided.    Malaraia prophylaxis recommended: none  Symptomatic treatment for traveler's diarrhea: azithromycin  Altitude illness prevention and treatment: none      Evacuation insurance advised and resources were provided to patient.    Total visit time 30 minutes  with over 50% of time spent counseling patient as detailed above.    Renay Lunsford CNP

## 2018-05-01 NOTE — MR AVS SNAPSHOT
After Visit Summary   5/1/2018    Rajiv Feng    MRN: [id number]           Patient Information     Date Of Birth          1970        Visit Information        Provider Department      5/1/2018 6:15 PM Renay Lunsford APRN [facility]        Today's Diagnoses     Travel advice encounter    -  1    Need for vaccination        H/O cold sores          Care Instructions    Today May 1, 2018 you received the    Flu Vaccine    Tetanus (Tdap) Vaccine    MMR (Measles Mumps Rubella) Vaccine    Typhoid - Oral. This prescription has been faxed to your pharmacy, please take as directed.   .    These appointments can be made as a NURSE ONLY visit.    **It is very important for the vaccinations to be given on the scheduled day(s), this helps ensure you receive the full effectiveness of the vaccine.**    Please call [facility] with any questions [phone]    Thank you for visiting [facility]              Follow-ups after your visit        Your next 10 appointments already scheduled     May 03, 2018 11:30 AM CDT   [facility] Patient with [facility]geois MD   [facility]    [address] [facility] [address] [phone]              Who to contact     If you have questions or need follow up information about today's clinic visit or your schedule please contact [facility] directly at [phone].  Normal or non-critical lab and imaging results will be communicated to you by MyChart, letter or phone within 4 business days after the clinic has received the results. If you do not hear from us within 7 days, please contact the clinic through MyChart or phone. If you have a critical or abnormal lab result, we will notify you by phone as soon as possible.  Submit refill requests through [employer] or call your pharmacy and they will forward the refill request to us. Please allow 3 business days for your refill  "to be completed.          Additional Information About Your Visit        [employer] Information     [employer] lets you send messages to your doctor, view your test results, renew your prescriptions, schedule appointments and more. To sign up, go to [email or web address] . Click on \"Log in\" on the left side of the screen, which will take you to the Welcome page. Then click on \"Sign up Now\" on the right side of the page.     You will be asked to enter the access code listed below, as well as some personal information. Please follow the directions to create your username and password.     Your access code is: [email or web address]  Expires: 2018  7:50 PM     Your access code will  in 90 days. If you need help or a new code, please call your [place] clinic or [phone].        Care EveryWhere ID     This is your Care EveryWhere ID. This could be used by other organizations to access your [place] medical records  [id number]        Your Vitals Were     Pulse Temperature Respirations Pulse Oximetry          79 97.9  F (36.6  C) (Oral) 16 100%         Blood Pressure from Last 3 Encounters:   18 118/77   18 116/73   17 102/66    Weight from Last 3 Encounters:   18 210 lb 9 oz (95.5 kg)   17 207 lb 12.8 oz (94.3 kg)   17 209 lb (94.8 kg)              We Performed the Following     HC FLU VAC PRESRV FREE QUAD SPLIT VIR 3+YRS IM     MMR VIRUS IMMUNIZATION, SUBCUT     TDAP VACCINE (ADACEL)          Today's Medication Changes          These changes are accurate as of 18  6:51 PM.  If you have any questions, ask your nurse or doctor.               Start taking these medicines.        Dose/Directions    typhoid CR capsule   Commonly known as:  VIVOTIF   Used for:  Need for vaccination, Travel advice encounter   Started by:  Renay Lunsford APRN CNP        Dose:  1 capsule   Take 1 capsule by mouth every other day   Quantity:  4 capsule   Refills:  0         These medicines have " changed or have updated prescriptions.        Dose/Directions    azithromycin 500 MG tablet   Commonly known as:  ZITHROMAX   This may have changed:    - medication strength  - how much to take  - how to take this  - when to take this  - additional instructions   Used for:  Travel advice encounter   Changed by:  Renay Lunsford APRN CNP        Dose:  500 mg   Take 1 tablet (500 mg) by mouth daily for 3 doses Take 1 tablet a day for up to 3 days for severe diarrhea   Quantity:  3 tablet   Refills:  0         Stop taking these medicines if you haven't already. Please contact your care team if you have questions.     DAYQUIL OR   Stopped by:  Renay Lunsford APRN CNP           mometasone 50 MCG/ACT spray   Commonly known as:  NASONEX   Stopped by:  Renay Lunsford APRN CNP                Where to get your medicines      These medications were sent to [facility]/pharmacy #[id number] - [place], [place] - [address]  [address] [facility] [place] [address]     Phone:  [phone]     azithromycin 500 MG tablet    typhoid CR capsule                Primary Care Provider Office Phone # Fax #    Mars Bourgeois [phone]       [facility] [address] [facility] [address]  [facility] [address]        Equal Access to Services     ANTONIETA ZARAGOZA : Hadii momo ku hadasho Soomaali, waaxda luqadaha, qaybta kaalmada adeegyada, waxay herlinda colón. So [facility] [phone].    ATENCIÓN: Si habla español, tiene a kinsey disposición servicios gratuitos de asistencia lingüística. Llame al [phone].    We comply with applicable federal civil rights laws and [place] laws. We do not discriminate on the basis of race, color, national origin, age, disability, sex, sexual orientation, or gender identity.            Thank you!     Thank you for choosing [facility]  for your care. Our goal is always to provide you with excellent care. Hearing back from our patients is one way we can continue to  improve our services. Please take a few minutes to complete the written survey that you may receive in the mail after your visit with us. Thank you!             Your Updated Medication List - Protect others around you: Learn how to safely use, store and throw away your medicines at www.disposemymeds.org.          This list is accurate as of 5/1/18  6:51 PM.  Always use your most recent med list.                   Brand Name Dispense Instructions for use Diagnosis    azithromycin 500 MG tablet    ZITHROMAX    3 tablet    Take 1 tablet (500 mg) by mouth daily for 3 doses Take 1 tablet a day for up to 3 days for severe diarrhea    Travel advice encounter       FINASTERIDE PO           typhoid CR capsule    VIVOTIF    4 capsule    Take 1 capsule by mouth every other day    Need for vaccination, Travel advice encounter       * valACYclovir 1000 mg tablet    VALTREX    4 tablet    Take 2 tablets (2,000 mg) by mouth 2 times daily    H/O cold sores       * valACYclovir 1000 mg tablet    VALTREX    20 tablet    Take 1 tablet (1,000 mg) by mouth 2 times daily    H/O cold sores       * Notice:  This list has 2 medication(s) that are the same as other medications prescribed for you. Read the directions carefully, and ask your doctor or other care provider to review them with you.

## 2018-05-01 NOTE — NURSING NOTE
"Chief Complaint   Patient presents with     Travel Clinic     [place]      /77  Pulse 79  Temp 97.9  F (36.6  C) (Oral)  Resp 16  SpO2 100% Estimated body mass index is 28.16 kg/(m^2) as calculated from the following:    Height as of 7/7/17: 6' 0.5\" (1.842 m).    Weight as of 2/6/18: 210 lb 9 oz (95.5 kg).  bp completed using cuff size: regular       Health Maintenance addressed:  NONE    n/a    Savannah Baptiste MA     "

## 2018-05-02 NOTE — NURSING NOTE
Screening Questionnaire for Adult Immunization    Are you sick today?   No   Do you have allergies to medications, food, a vaccine component or latex?   No   Have you ever had a serious reaction after receiving a vaccination?   No   Do you have a long-term health problem with heart disease, lung disease, asthma, kidney disease, metabolic disease (e.g. diabetes), anemia, or other blood disorder?   No   Do you have cancer, leukemia, HIV/AIDS, or any other immune system problem?   No   In the past 3 months, have you taken medications that affect  your immune system, such as prednisone, other steroids, or anticancer drugs; drugs for the treatment of rheumatoid arthritis, Crohn s disease, or psoriasis; or have you had radiation treatments?   No   Have you had a seizure, or a brain or other nervous system problem?   No   During the past year, have you received a transfusion of blood or blood     products, or been given immune (gamma) globulin or antiviral drug?   No   For women: Are you pregnant or is there a chance you could become        pregnant during the next month?   No   Have you received any vaccinations in the past 4 weeks?   No     Immunization questionnaire answers were all negative.      Prior to injection verified patient identity using patient's name and date of birth.    Per orders of HIRAL Lunsford, injection of Flu, Adacel and MMR given by Savannah Baptiste. Patient instructed to remain in clinic for 15 minutes afterwards, and to report any adverse reaction to me immediately.       Screening performed by Savannah Baptiste on 5/1/2018 at 7:16 PM.

## 2018-05-03 ENCOUNTER — OFFICE VISIT (OUTPATIENT)
Dept: FAMILY MEDICINE | Facility: CLINIC | Age: 48
End: 2018-05-03
Payer: COMMERCIAL

## 2018-05-03 VITALS
HEART RATE: 90 BPM | OXYGEN SATURATION: 97 % | BODY MASS INDEX: 26.64 KG/M2 | SYSTOLIC BLOOD PRESSURE: 120 MMHG | DIASTOLIC BLOOD PRESSURE: 76 MMHG | TEMPERATURE: 98.9 F | HEIGHT: 73 IN | WEIGHT: 201 LBS

## 2018-05-03 DIAGNOSIS — L65.9 ALOPECIA: Primary | ICD-10-CM

## 2018-05-03 DIAGNOSIS — G47.33 OSA (OBSTRUCTIVE SLEEP APNEA): ICD-10-CM

## 2018-05-03 DIAGNOSIS — Z86.19 H/O COLD SORES: ICD-10-CM

## 2018-05-03 PROCEDURE — 99213 OFFICE O/P EST LOW 20 MIN: CPT | Performed by: INTERNAL MEDICINE

## 2018-05-03 RX ORDER — FINASTERIDE 1 MG/1
1 TABLET, FILM COATED ORAL DAILY
Qty: 90 TABLET | Refills: 3 | Status: SHIPPED | OUTPATIENT
Start: 2018-05-03 | End: 2019-04-25

## 2018-05-03 RX ORDER — VALACYCLOVIR HYDROCHLORIDE 1 G/1
1000 TABLET, FILM COATED ORAL 2 TIMES DAILY
Qty: 20 TABLET | Refills: 11 | Status: SHIPPED | OUTPATIENT
Start: 2018-05-03 | End: 2019-04-23 | Stop reason: DRUGHIGH

## 2018-05-03 NOTE — NURSING NOTE
"Chief Complaint   Patient presents with     Recheck Medication       Initial /76 (BP Location: Right arm, Patient Position: Sitting, Cuff Size: Adult Regular)  Pulse 90  Temp 98.9  F (37.2  C) (Tympanic)  Ht 6' 1\" (1.854 m)  Wt 201 lb (91.2 kg)  SpO2 97%  BMI 26.52 kg/m2 Estimated body mass index is 26.52 kg/(m^2) as calculated from the following:    Height as of this encounter: 6' 1\" (1.854 m).    Weight as of this encounter: 201 lb (91.2 kg).  Medication Reconciliation: complete    Cameron Vivar CMA on 5/3/2018 at 11:25 AM    "

## 2018-05-03 NOTE — MR AVS SNAPSHOT
"              After Visit Summary   5/3/2018    Rajiv Feng    MRN: [id number]           Patient Information     Date Of Birth          1970        Visit Information        Provider Department      5/3/2018 11:30 AM Mars Bourgeois MD [facility]        Today's Diagnoses     Alopecia    -  1    H/O cold sores        SVEN (obstructive sleep apnea)           Follow-ups after your visit        Who to contact     If you have questions or need follow up information about today's clinic visit or your schedule please contact [facility] directly at [phone].  Normal or non-critical lab and imaging results will be communicated to you by [employer], letter or phone within 4 business days after the clinic has received the results. If you do not hear from us within 7 days, please contact the clinic through [employer] or phone. If you have a critical or abnormal lab result, we will notify you by phone as soon as possible.  Submit refill requests through [employer] or call your pharmacy and they will forward the refill request to us. Please allow 3 business days for your refill to be completed.          Additional Information About Your Visit        MyChart Information     [employer] lets you send messages to your doctor, view your test results, renew your prescriptions, schedule appointments and more. To sign up, go to [email or web address] . Click on \"Log in\" on the left side of the screen, which will take you to the Welcome page. Then click on \"Sign up Now\" on the right side of the page.     You will be asked to enter the access code listed below, as well as some personal information. Please follow the directions to create your username and password.     Your access code is: [email or web address]  Expires: 2018  7:50 PM     Your access code will  in 90 days. If you need help or a new code, please call your [facility] or [phone].        Care EveryWhere ID     This is your Care EveryWhere ID. " "This could be used by other organizations to access your [place] medical records  [id number]        Your Vitals Were     Pulse Temperature Height Pulse Oximetry BMI (Body Mass Index)       90 98.9  F (37.2  C) (Tympanic) 6' 1\" (1.854 m) 97% 26.52 kg/m2        Blood Pressure from Last 3 Encounters:   05/03/18 120/76   05/01/18 118/77   02/06/18 116/73    Weight from Last 3 Encounters:   05/03/18 201 lb (91.2 kg)   02/06/18 210 lb 9 oz (95.5 kg)   07/07/17 207 lb 12.8 oz (94.3 kg)              Today, you had the following     No orders found for display         Today's Medication Changes          These changes are accurate as of 5/3/18 12:10 PM.  If you have any questions, ask your nurse or doctor.               These medicines have changed or have updated prescriptions.        Dose/Directions    finasteride 1 MG tablet   Commonly known as:  PROPECIA   This may have changed:    - medication strength  - how much to take  - when to take this   Used for:  Alopecia   Changed by:  Mars Bourgeois MD        Dose:  1 mg   Take 1 tablet (1 mg) by mouth daily   Quantity:  90 tablet   Refills:  3            Where to get your medicines      These medications were sent to [facility]/pharmacy #[id number] - [place] - [address]  [address] [facility] [place] [address]     Phone:  [phone]     finasteride 1 MG tablet    valACYclovir 1000 mg tablet                Primary Care Provider Office Phone # Fax #    Mars Bourgeois [phone] [address] [facility] [address]  [facility] [address]        Equal Access to Services     NIKITA ZARAGOZA : Hadii momo Carias, jayleen downey, qaarlene morse. So [facility] [phone].    ATENCIÓN: Si habla español, tiene a kinsey disposición servicios gratuitos de asistencia lingüística. Llame al [phone].    We comply with applicable federal civil rights laws and [place] laws. We do not discriminate on the basis of race, " color, national origin, age, disability, sex, sexual orientation, or gender identity.            Thank you!     Thank you for choosing [facility]  for your care. Our goal is always to provide you with excellent care. Hearing back from our patients is one way we can continue to improve our services. Please take a few minutes to complete the written survey that you may receive in the mail after your visit with us. Thank you!             Your Updated Medication List - Protect others around you: Learn how to safely use, store and throw away your medicines at www.disposemymeds.org.          This list is accurate as of 5/3/18 12:10 PM.  Always use your most recent med list.                   Brand Name Dispense Instructions for use Diagnosis    azithromycin 500 MG tablet    ZITHROMAX    3 tablet    Take 1 tablet (500 mg) by mouth daily for 3 doses Take 1 tablet a day for up to 3 days for severe diarrhea    Travel advice encounter       finasteride 1 MG tablet    PROPECIA    90 tablet    Take 1 tablet (1 mg) by mouth daily    Alopecia       typhoid CR capsule    VIVOTIF    4 capsule    Take 1 capsule by mouth every other day    Need for vaccination, Travel advice encounter       * valACYclovir 1000 mg tablet    VALTREX    4 tablet    Take 2 tablets (2,000 mg) by mouth 2 times daily    H/O cold sores       * valACYclovir 1000 mg tablet    VALTREX    20 tablet    Take 1 tablet (1,000 mg) by mouth 2 times daily    H/O cold sores       * Notice:  This list has 2 medication(s) that are the same as other medications prescribed for you. Read the directions carefully, and ask your doctor or other care provider to review them with you.

## 2018-05-03 NOTE — PROGRESS NOTES
SUBJECTIVE:   Rajiv Feng is a 47 year old male who presents to clinic today for the following health issues:      Medication Refill of Finasteride and Valacyclovir    Taking Medication as prescribed: yes    Side Effects:  None    Medication Helping Symptoms:  yes       Here for refill on valtrex and propecia  Propecia is working well  He takes valtrex for rare cold sores    He had labs drawn at the end of March and the results were discussed  I recommended increasing exercise to address HDL    He is planning a trip to [place] soon and had shots at a travel consult     Problem list and histories reviewed & adjusted, as indicated.  Additional history: as documented    Patient Active Problem List   Diagnosis     SVEN (obstructive sleep apnea)     Past Surgical History:   Procedure Laterality Date     NO HISTORY OF SURGERY         Social History   Substance Use Topics     Smoking status: Never Smoker     Smokeless tobacco: Never Used     Alcohol use No     Family History   Problem Relation Age of Onset     Vascular Disease Mother      smoker      Thyroid Disease Mother      Lung Cancer Father          Current Outpatient Prescriptions   Medication Sig Dispense Refill     azithromycin (ZITHROMAX) 500 MG tablet Take 1 tablet (500 mg) by mouth daily for 3 doses Take 1 tablet a day for up to 3 days for severe diarrhea 3 tablet 0     finasteride (PROPECIA) 1 MG tablet Take 1 tablet (1 mg) by mouth daily 90 tablet 3     typhoid (VIVOTIF) CR capsule Take 1 capsule by mouth every other day 4 capsule 0     valACYclovir (VALTREX) 1000 mg tablet Take 2 tablets (2,000 mg) by mouth 2 times daily 4 tablet 1     valACYclovir (VALTREX) 1000 mg tablet Take 1 tablet (1,000 mg) by mouth 2 times daily 20 tablet 11     [DISCONTINUED] valACYclovir (VALTREX) 1000 mg tablet Take 1 tablet (1,000 mg) by mouth 2 times daily 20 tablet 0     Allergies   Allergen Reactions     Broccoli [Brassica Oleracea Italica] Anaphylaxis     Penicillin G  "Anaphylaxis       Reviewed and updated as needed this visit by clinical staff       Reviewed and updated as needed this visit by Provider         ROS:  Constitutional, HEENT, cardiovascular, pulmonary, gi and gu systems are negative, except as otherwise noted.    OBJECTIVE:     /76 (BP Location: Right arm, Patient Position: Sitting, Cuff Size: Adult Regular)  Pulse 90  Temp 98.9  F (37.2  C) (Tympanic)  Ht 6' 1\" (1.854 m)  Wt 201 lb (91.2 kg)  SpO2 97%  BMI 26.52 kg/m2  Body mass index is 26.52 kg/(m^2).  GENERAL: healthy, alert and no distress  EYES: Eyes grossly normal to inspection, PERRL and conjunctivae and sclerae normal  HENT: ear canals and TM's normal, nose and mouth without ulcers or lesions  NECK: no adenopathy, no asymmetry, masses, or scars and thyroid normal to palpation  RESP: lungs clear to auscultation - no rales, rhonchi or wheezes  CV: regular rate and rhythm, normal S1 S2, no S3 or S4, no murmur, click or rub, no peripheral edema and peripheral pulses strong  ABDOMEN: soft, nontender, no hepatosplenomegaly, no masses and bowel sounds normal  MS: no gross musculoskeletal defects noted, no edema  SKIN: no suspicious lesions or rashes  NEURO: Normal strength and tone, mentation intact and speech normal  PSYCH: mentation appears normal, affect normal/bright    Diagnostic Test Results:  Results for orders placed or performed in visit on 03/30/18   Comprehensive metabolic panel   Result Value Ref Range    Sodium 139 133 - 144 mmol/L    Potassium 3.7 3.4 - 5.3 mmol/L    Chloride 107 94 - 109 mmol/L    Carbon Dioxide 27 20 - 32 mmol/L    Anion Gap 5 3 - 14 mmol/L    Glucose 85 70 - 99 mg/dL    Urea Nitrogen 11 7 - 30 mg/dL    Creatinine 1.01 0.66 - 1.25 mg/dL    GFR Estimate 79 >60 mL/min/1.7m2    GFR Estimate If Black >90 >60 mL/min/1.7m2    Calcium 9.0 8.5 - 10.1 mg/dL    Bilirubin Total 0.5 0.2 - 1.3 mg/dL    Albumin 4.1 3.4 - 5.0 g/dL    Protein Total 6.9 6.8 - 8.8 g/dL    Alkaline " Phosphatase 53 40 - 150 U/L    ALT 40 0 - 70 U/L    AST 31 0 - 45 U/L   Lipid Profile with reflex to direct LDL   Result Value Ref Range    Cholesterol 148 <200 mg/dL    Triglycerides 70 <150 mg/dL    HDL Cholesterol 38 (L) >39 mg/dL    LDL Cholesterol Calculated 96 <100 mg/dL    Non HDL Cholesterol 110 <130 mg/dL   CBC with platelets   Result Value Ref Range    WBC 5.3 4.0 - 11.0 10e9/L    RBC Count 4.88 4.4 - 5.9 10e12/L    Hemoglobin 14.5 13.3 - 17.7 g/dL    Hematocrit 43.6 40.0 - 53.0 %    MCV 89 78 - 100 fl    MCH 29.7 26.5 - 33.0 pg    MCHC 33.3 31.5 - 36.5 g/dL    RDW 13.1 10.0 - 15.0 %    Platelet Count 165 150 - 450 10e9/L   TSH with free T4 reflex   Result Value Ref Range    TSH 1.42 0.40 - 4.00 mU/L   PSA, screen   Result Value Ref Range    PSA 0.63 0 - 4 ug/L       ASSESSMENT/PLAN:       1. Alopecia  Continue propecia   - finasteride (PROPECIA) 1 MG tablet; Take 1 tablet (1 mg) by mouth daily  Dispense: 90 tablet; Refill: 3    2. H/O cold sores  Continue PRN valtrex  - valACYclovir (VALTREX) 1000 mg tablet; Take 1 tablet (1,000 mg) by mouth 2 times daily  Dispense: 20 tablet; Refill: 11    3. SVEN (obstructive sleep apnea)  Continue CPAP      FUTURE APPOINTMENTS:       - preventive exam with fasting labs in Spring of 2019 or return sooner if new symptoms or problems arise    Mars Bourgeois MD  [facility]

## 2018-07-31 ENCOUNTER — OFFICE VISIT (OUTPATIENT)
Dept: SLEEP MEDICINE | Facility: CLINIC | Age: 48
End: 2018-07-31
Payer: COMMERCIAL

## 2018-07-31 VITALS
OXYGEN SATURATION: 98 % | HEIGHT: 73 IN | RESPIRATION RATE: 16 BRPM | WEIGHT: 195 LBS | DIASTOLIC BLOOD PRESSURE: 72 MMHG | BODY MASS INDEX: 25.84 KG/M2 | SYSTOLIC BLOOD PRESSURE: 108 MMHG | HEART RATE: 89 BPM

## 2018-07-31 DIAGNOSIS — G47.33 OSA (OBSTRUCTIVE SLEEP APNEA): Primary | ICD-10-CM

## 2018-07-31 PROCEDURE — 99204 OFFICE O/P NEW MOD 45 MIN: CPT | Performed by: PHYSICIAN ASSISTANT

## 2018-07-31 NOTE — NURSING NOTE
"Chief Complaint   Patient presents with     Sleep Problem     Establish care, cpap machine is not working       Initial /72  Pulse 89  Resp 16  Ht 1.854 m (6' 1\")  Wt 88.5 kg (195 lb)  SpO2 98%  BMI 25.73 kg/m2 Estimated body mass index is 25.73 kg/(m^2) as calculated from the following:    Height as of this encounter: 1.854 m (6' 1\").    Weight as of this encounter: 88.5 kg (195 lb).    Medication Reconciliation: complete    Neck circumference: 16 inches / 41 centimeters.    ESS 0    Bee Amaya MA      "

## 2018-07-31 NOTE — PATIENT INSTRUCTIONS

## 2018-07-31 NOTE — PROGRESS NOTES
Sleep Consultation:    Date on this visit: 7/31/2018    Rajiv Feng is sent by No ref. provider found for a sleep consultation regarding SVEN.    Primary Physician: Mars Bourgeois     Rajiv Feng presents to get a prescription for a replacement CPAP machine for previously diagnosed SVEN. His medical history is non-contributory.     He had a sleep study at [place] on 10/25/2009. His AHI was 22/hr with O2 marielena 85%. CPAP was titrated to 7 cm with a few remaining RERAs. He weighed 175 pounds at that time.    He was on auto CPAP 6-15 cm. His machine broke about 10 days ago. His machine was the first one he got, in 2009. The machine started reading that service was required. He uses nasal pillows mask. He denies dry mouth. He is comfortable with the CPAP pressures. He is not observed to snore with CPAP. Sometimes he forgets to put the mask on when he gets up for the restroom. His wife usually nudges him to put it on then. He denies mask leak.    He tried a dental appliance once in the past, but he would remove it in the night.     The compliance data shows that from 6/26-7/24/2018, he has used the CPAP for 27/30 nights (his machine stopped on 7/21), 76.7% of nights for >4 hours.  The 90th% pressure is 8.2 cm.  The average time in large leak is 0 sec.  The average nightly usage is 5:33.  The average AHI is 3.6/hr.      Rajiv goes to sleep at 10:30 PM during the week. He wakes up at 6:00 AM with an alarm at 5:40 AM. He falls asleep in 20 minutes.  Rajiv denies difficulty falling asleep.  He wakes up 0-1 times a night for 5 minutes before falling back to sleep.  Rajiv wakes up to go to the bathroom.  On weekends, Rajiv goes to sleep at 10:30 PM.  He wakes up at 7:00 AM without an alarm. He falls asleep in 20 minutes.  Patient gets an average of 8 hours of sleep per night.     Patient does use electronics in bed (5 minutes) and does not watch TV in bed, worry in bed about anything and read in bed.     Rajiv does not  do shift work.  He works day shifts.  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 He lives with his wife.    Rajiv does have a regular bed partner. There is no report of gasping, snorting witnessed apneas. They never sleep separately.  Patient sleeps on his back. He has no snort arousals, morning headaches, morning confusion and restless legs,. Rajiv denies any bruxism, sleep walking, sleep talking, dream enactment, sleep paralysis, cataplexy and hypnogogic/hypnopompic hallucinations.    Rajiv denies difficulty breathing through his nose, claustrophobia, reflux at night, heartburn and depression.      Rajiv has gained 20 pounds in 10 years (but is down 25 pounds in the last couple of years).  Patient describes themself as a night person. He can function fine in the morning if he gets enough sleep. He would prefer to go to sleep at 12:00 AM and wake up at 9:00 AM.  Patient's [place] Sleepiness score 0/24 inconsistent with daytime sleepiness.      Rajiv rarely takes naps. He takes no inadvertant naps.  He denies dozing while driving.  Patient was counseled on the importance of driving while alert, to pull over if drowsy, or nap before getting into the vehicle if sleepy.  He uses 1 sodas/week. Last caffeine intake is usually with dinner at the latest.    Allergies:    Allergies   Allergen Reactions     Broccoli [Brassica Oleracea Italica] Anaphylaxis     Penicillin G Anaphylaxis       Medications:    Current Outpatient Prescriptions   Medication Sig Dispense Refill     finasteride (PROPECIA) 1 MG tablet Take 1 tablet (1 mg) by mouth daily 90 tablet 3     typhoid (VIVOTIF) CR capsule Take 1 capsule by mouth every other day 4 capsule 0     valACYclovir (VALTREX) 1000 mg tablet Take 1 tablet (1,000 mg) by mouth 2 times daily 20 tablet 11     valACYclovir (VALTREX) 1000 mg tablet Take 2 tablets (2,000 mg) by mouth 2 times daily 4 tablet 1       Problem List:  Patient Active Problem List    Diagnosis Date Noted     SVEN  (obstructive sleep apnea) 07/07/2017     Priority: Medium        Past Medical/Surgical History:  Past Medical History:   Diagnosis Date     SVEN (obstructive sleep apnea) 7/7/2017     Past Surgical History:   Procedure Laterality Date     EYE SURGERY      prk     NO HISTORY OF SURGERY       wisdom teeth         Social History:  Social History     Social History     Marital status:      Spouse name: N/A     Number of children: N/A     Years of education: N/A     Occupational History     Not on file.     Social History Main Topics     Smoking status: Never Smoker     Smokeless tobacco: Never Used     Alcohol use No     Drug use: No     Sexual activity: Yes     Partners: Female     Other Topics Concern     Not on file     Social History Narrative       Family History:  Family History   Problem Relation Age of Onset     Vascular Disease Mother      smoker      Thyroid Disease Mother      Lung Cancer Father      Sleep Apnea Brother      Sleep Apnea Brother        Review of Systems:  A complete review of systems reviewed by me is negative with the exeption of what has been mentioned in the history of present illness.  CONSTITUTIONAL: NEGATIVE for weight gain/loss, fever, chills, sweats or night sweats.  CONSTITUTIONAL:  POSITIVE for  drug allergies   EYES: NEGATIVE for changes in vision, blind spots, double vision.  ENT: NEGATIVE for ear pain, sore throat, sinus pain, post-nasal drip, runny nose, bloody nose  CARDIAC: NEGATIVE for fast heartbeats or fluttering in chest, chest pain or pressure, breathlessness when lying flat, swollen legs or swollen feet.  NEUROLOGIC: NEGATIVE headaches, weakness or numbness in the arms or legs.  DERMATOLOGIC: NEGATIVE for rashes, new moles or change in mole(s)  PULMONARY: NEGATIVE SOB at rest, SOB with activity, dry cough, productive cough, coughing up blood, wheezing or whistling when breathing.    GASTROINTESTINAL: NEGATIVE for nausea or vomitting, loose or watery stools, fat or  "grease in stools, constipation, abdominal pain, bowel movements black in color or blood noted.  GENITOURINARY: NEGATIVE for pain during urination, blood in urine, urinating more frequently than usual, irregular menstrual periods.  MUSCULOSKELETAL: NEGATIVE for muscle pain, bone or joint pain, swollen joints.  ENDOCRINE: NEGATIVE for increased thirst or urination, diabetes.  LYMPHATIC: NEGATIVE for swollen lymph nodes, lumps or bumps in the breasts or nipple discharge.    Physical Examination:  Vitals: /72  Pulse 89  Resp 16  Ht 1.854 m (6' 1\")  Wt 88.5 kg (195 lb)  SpO2 98%  BMI 25.73 kg/m2    Neck Cir (cm): 41 cm    [place] Total Score 7/31/2018   Total score - [place] 0       SAGAR Total Score: 1 (07/31/18 1403)    GENERAL APPEARANCE: healthy, alert, no distress and cooperative  EYES: Eyes grossly normal to inspection, PERRL, conjunctivae and sclerae normal and lids and lashes normal  HENT: nose and mouth without ulcers or lesions, slight retrognathia  NECK: no adenopathy, no asymmetry, masses, or scars, thyroid normal to palpation and trachea midline and normal to palpation  RESP: lungs clear to auscultation - no rales, rhonchi or wheezes  CV: regular rates and rhythm, normal S1 S2, no S3 or S4, no murmur, click or rub and no irregular beats  LYMPHATICS: no cervical adenopathy  MS: extremities normal- no gross deformities noted  NEURO: Normal strength and tone, mentation intact, speech normal and cranial nerves 2-12 intact  Mallampati Class: I.  Tonsillar Stage: 1  hidden by pillars.    Impression/Plan:    (G47.33) SVEN (obstructive sleep apnea)  (primary encounter diagnosis)  Comment: Mr. Feng presents to get a new CPAP machine as his broke about 10 days ago. He had a sleep study at [place] on 10/25/2009. His AHI was 22/hr with O2 marielena 85%. CPAP was titrated to 7 cm with a few remaining RERAs. He is doing relatively well with auto CPAP 6-15 cm. His usage is a little low, 5.5 hours per night. He " says his wife has been going to bed later because she is working 3 jobs now, and so he has been going to bed later. He does not snore with CPAP, denies sleepiness, and his download shows well controlled apnea. He has been working on weight loss, but his weight is still up 20 pounds compared to the time of his sleep study.  Plan: Comprehensive DME        An order was placed for a replacement auto CPAP 6-15 cm. We reviewed options for machines he can get. I encouraged him to try to get more sleep.       He will follow up with me in approximately two years.       Polysomnography reviewed.  Obstructive sleep apnea reviewed.  Complications of untreated sleep apnea were reviewed.  45 minutes was spent during this visit, over 50% in counseling and coordination of care.   Bennett Goltz, PA-C    CC: Mars Bourgeois MD

## 2018-07-31 NOTE — MR AVS SNAPSHOT
After Visit Summary   7/31/2018    Rajiv Feng    MRN: [id number]           Patient Information     Date Of Birth          1970        Visit Information        Provider Department      7/31/2018 2:00 PM Goltz, Bennett Ezra, PA-C [facility]        Today's Diagnoses     SVEN (obstructive sleep apnea)    -  1      Care Instructions      Your BMI is Body mass index is 25.73 kg/(m^2).  Weight management is a personal decision.  If you are interested in exploring weight loss strategies, the following discussion covers the approaches that may be successful. Body mass index (BMI) is one way to tell whether you are at a healthy weight, overweight, or obese. It measures your weight in relation to your height.  A BMI of 18.5 to 24.9 is in the healthy range. A person with a BMI of 25 to 29.9 is considered overweight, and someone with a BMI of 30 or greater is considered obese. More than two-thirds of American adults are considered overweight or obese.  Being overweight or obese increases the risk for further weight gain. Excess weight may lead to heart disease and diabetes.  Creating and following plans for healthy eating and physical activity may help you improve your health.  Weight control is part of healthy lifestyle and includes exercise, emotional health, and healthy eating habits. Careful eating habits lifelong are the mainstay of weight control. Though there are significant health benefits from weight loss, long-term weight loss with diet alone may be very difficult to achieve- studies show long-term success with dietary management in less than 10% of people. Attaining a healthy weight may be especially difficult to achieve in those with severe obesity. In some cases, medications, devices and surgical management might be considered.  What can you do?  If you are overweight or obese and are interested in methods for weight loss, you should discuss this with your provider.     Consider  reducing daily calorie intake by 500 calories.     Keep a food journal.     Avoiding skipping meals, consider cutting portions instead.    Diet combined with exercise helps maintain muscle while optimizing fat loss. Strength training is particularly important for building and maintaining muscle mass. Exercise helps reduce stress, increase energy, and improves fitness. Increasing exercise without diet control, however, may not burn enough calories to loose weight.       Start walking three days a week 10-20 minutes at a time    Work towards walking thirty minutes five days a week     Eventually, increase the speed of your walking for 1-2 minutes at time    In addition, we recommend that you review healthy lifestyles and methods for weight loss available through the National Institutes of Health patient information sites:  http://win.niddk.nih.gov/publications/index.htm    And look into health and wellness programs that may be available through your health insurance provider, employer, local community center, or rylan club.    Weight management plan: Patient was referred to their PCP to discuss a diet and exercise plan.              Follow-ups after your visit        Follow-up notes from your care team     Return in about 2 years (around 7/31/2020) for CPAP compliance recheck.      Who to contact     If you have questions or need follow up information about today's clinic visit or your schedule please contact [facility] directly at [phone].  Normal or non-critical lab and imaging results will be communicated to you by MyChart, letter or phone within 4 business days after the clinic has received the results. If you do not hear from us within 7 days, please contact the clinic through MyChart or phone. If you have a critical or abnormal lab result, we will notify you by phone as soon as possible.  Submit refill requests through [employer] or call your pharmacy and they will forward the refill request  "to us. Please allow 3 business days for your refill to be completed.          Additional Information About Your Visit        [employer] Information     [employer] lets you send messages to your doctor, view your test results, renew your prescriptions, schedule appointments and more. To sign up, go to [email or web address] . Click on \"Log in\" on the left side of the screen, which will take you to the Welcome page. Then click on \"Sign up Now\" on the right side of the page.     You will be asked to enter the access code listed below, as well as some personal information. Please follow the directions to create your username and password.     Your access code is: [email or web address]  Expires: 10/29/2018  3:06 PM     Your access code will  in 90 days. If you need help or a new code, please call your [place] clinic or [phone].        Care EveryWhere ID     This is your [facility] EveryWhere ID. This could be used by other organizations to access your [place] medical records  [id number]        Your Vitals Were     Pulse Respirations Height Pulse Oximetry BMI (Body Mass Index)       89 16 1.854 m (6' 1\") 98% 25.73 kg/m2        Blood Pressure from Last 3 Encounters:   18 108/72   18 120/76   18 118/77    Weight from Last 3 Encounters:   18 88.5 kg (195 lb)   18 91.2 kg (201 lb)   18 95.5 kg (210 lb 9 oz)              We Performed the Following     Comprehensive DME        Primary Care Provider Office Phone # Fax #    Mars Bourgeois [phone] [address] [facility] [address]  [facility] [address]        Equal Access to Services     [facility] MILA : Luz Carias, jayleen downey, miriam middleton, arlene colón. So [facility] [phone].    ATENCIÓN: Si habla español, tiene a kinsey disposición servicios gratuitos de asistencia lingüística. Llame al [phone].    We comply with applicable federal civil rights laws and [place] laws. " We do not discriminate on the basis of race, color, national origin, age, disability, sex, sexual orientation, or gender identity.            Thank you!     Thank you for choosing [facility]  for your care. Our goal is always to provide you with excellent care. Hearing back from our patients is one way we can continue to improve our services. Please take a few minutes to complete the written survey that you may receive in the mail after your visit with us. Thank you!             Your Updated Medication List - Protect others around you: Learn how to safely use, store and throw away your medicines at www.disposemymeds.org.          This list is accurate as of 7/31/18  4:55 PM.  Always use your most recent med list.                   Brand Name Dispense Instructions for use Diagnosis    finasteride 1 MG tablet    PROPECIA    90 tablet    Take 1 tablet (1 mg) by mouth daily    Alopecia       typhoid CR capsule    VIVOTIF    4 capsule    Take 1 capsule by mouth every other day    Need for vaccination, Travel advice encounter       * valACYclovir 1000 mg tablet    VALTREX    4 tablet    Take 2 tablets (2,000 mg) by mouth 2 times daily    H/O cold sores       * valACYclovir 1000 mg tablet    VALTREX    20 tablet    Take 1 tablet (1,000 mg) by mouth 2 times daily    H/O cold sores       * Notice:  This list has 2 medication(s) that are the same as other medications prescribed for you. Read the directions carefully, and ask your doctor or other care provider to review them with you.

## 2018-08-10 ENCOUNTER — DOCUMENTATION ONLY (OUTPATIENT)
Dept: SLEEP MEDICINE | Facility: CLINIC | Age: 48
End: 2018-08-10

## 2018-08-10 NOTE — PROGRESS NOTES
Patient was offered choice of vendor and chose [facility].  Patient Rajiv Feng was set up at [facility]  on August 10, 2018. Patient received a Resmed AirSense 10 Auto. Pressures were set at 6-15 cm H2O.   Patient s ramp is 0 cm H2O for Off and FLEX/EPR is 2.  Patient received a [employer] Respironics Mask name: NUANCE PRO  Pillow mask Size Small, heated tubing and heated humidifier.  Patient is not enrolled in the STM Program and does need to meet compliance. Patient NEEDS DOWNLOAD ONLY.    Lavern Watson

## 2018-08-16 ENCOUNTER — DOCUMENTATION ONLY (OUTPATIENT)
Dept: SLEEP MEDICINE | Facility: CLINIC | Age: 48
End: 2018-08-16
Payer: COMMERCIAL

## 2018-08-16 NOTE — PROGRESS NOTES
3 DAY STM VISIT    Diagnostic AHI: 22 PSG    Patient contacted for 3 day STM visit  Data only recheck: Patient needs downloads only.      Device type: Auto-CPAP  PAP settings from order::  CPAP min 6 cm  H20       CPAP max 15 cm  H20  Mask type:    Nasal Pillows     Device settings from machine      Min CPAP 6.0            Max CPAP 15.0          CPAP fixed 0 cm  H20  Assessment: Nightly usage over four hours.  Action plan: Pt to have f/u 14 day [facility] visit.  Patient has a follow up visit scheduled:   no.

## 2018-08-27 ENCOUNTER — DOCUMENTATION ONLY (OUTPATIENT)
Dept: SLEEP MEDICINE | Facility: CLINIC | Age: 48
End: 2018-08-27

## 2018-08-27 NOTE — PROGRESS NOTES
14 DAY STM VISIT    Diagnostic AHI: 22   PSG    Subjective measures:   Pt states things are going well and has no issues or complaints.  Pt is benefiting from therapy.      Assessment: Pt meeting objective benchmarks.  Patient meeting subjective benchmarks.   Action plan: pt to have 30 day STM visit.    Device type: Auto-CPAP  PAP settings: CPAP min 6.0 cm  H20     CPAP max 15.0 cm  H20    95th% pressure 8.9 cm   Mask type:  Nasal Pillows  Objective measures: 14 day rolling measures      Compliance  92 %      Leak  7.2 lpm  last  upload      AHI 0.97   last  upload      Average number of minutes 378     Average hours of usage 6.3          Objective measure goal  Compliance   Goal >70%  Leak   Goal < 24 lpm  AHI  Goal < 5  Usage  Goal >240

## 2018-09-10 ENCOUNTER — DOCUMENTATION ONLY (OUTPATIENT)
Dept: SLEEP MEDICINE | Facility: CLINIC | Age: 48
End: 2018-09-10
Payer: COMMERCIAL

## 2018-09-10 NOTE — PROGRESS NOTES
30 DAY STM VISIT    Diagnostic AHI: 22 PSG    Subjective measures:   Patient doing well no questions or concerns.     Assessment: Pt meeting objective benchmarks.     Action plan: Pt to have 6 month [facility] visit.  Patient does not have a follow up visit.   Device type: Auto-CPAP  PAP settings: CPAP min 6.0 cm  H20     CPAP max 15.0 cm  H20    95th% pressure 8.9 cm  H20   Mask type:  Nasal Pillows  Objective measures: 14 day rolling measures      Compliance  100 %      Leak  10.11 lpm  last  upload      AHI 1.03   last  upload      Average number of minutes 376      Objective measure goal  Compliance   Goal >70%  Leak   Goal < 24 lpm  AHI  Goal < 5  Usage  Goal >240

## 2019-02-07 ENCOUNTER — DOCUMENTATION ONLY (OUTPATIENT)
Dept: SLEEP MEDICINE | Facility: CLINIC | Age: 49
End: 2019-02-07

## 2019-02-07 NOTE — PROGRESS NOTES
6 month [facility] Recheck Visit     Diagnostic AHI: 22    PSG    Data only recheck     Assessment: Pt meeting objective benchmarks.     Action plan:   pt to follow up per provider request       Device type: Auto-CPAP  PAP settings: CPAP min 6.0 cm  H20     CPAP max 15.0 cm  H20        95th% pressure 9.4 cm  H20   Objective measures: 14 day rolling measures      Compliance  92 %      Leak  5.49 lpm  last  upload      AHI 0.76   last  upload      Average number of minutes 368      Objective measure goal  Compliance   Goal >70%  Leak   Goal < 24 lpm  AHI  Goal < 5  Usage  Goal >240

## 2019-03-04 ENCOUNTER — TELEPHONE (OUTPATIENT)
Dept: FAMILY MEDICINE | Facility: CLINIC | Age: 49
End: 2019-03-04

## 2019-03-04 DIAGNOSIS — Z13.6 SCREENING FOR HEART DISEASE: Primary | ICD-10-CM

## 2019-03-04 NOTE — TELEPHONE ENCOUNTER
Patient requesting cardiac calcium test due to family history of cardiac problems. Please advise.    Cameron Vivra CMA on 3/4/2019 at 12:46 PM

## 2019-03-04 NOTE — TELEPHONE ENCOUNTER
Reason for Call: Request for an order or referral:    Order or referral being requested: Cardiac Calcium test     Date needed: as soon as possible    Has the patient been seen by the PCP for this problem? NO    Additional comments: Pt's younger brother had a heart attack 3/1/18.  Cardiac problems run in his family and Rajiv would like to know if this is something Dr. Bourgeois would recommend this test  Phone number Patient can be reached at:  Home number on file [phone] (home)    Best Time:  any    Can we leave a detailed message on this number?  YES    Call taken on 3/4/2019 at 12:31 PM by Crystal Gallagher

## 2019-04-01 ENCOUNTER — HOSPITAL ENCOUNTER (OUTPATIENT)
Dept: CARDIOLOGY | Facility: CLINIC | Age: 49
Discharge: HOME | End: 2019-04-01
Attending: INTERNAL MEDICINE | Admitting: INTERNAL MEDICINE
Payer: COMMERCIAL

## 2019-04-01 DIAGNOSIS — Z13.6 SCREENING FOR HEART DISEASE: ICD-10-CM

## 2019-04-01 PROCEDURE — 75571 CT HRT W/O DYE W/CA TEST: CPT | Mod: 26 | Performed by: INTERNAL MEDICINE

## 2019-04-01 PROCEDURE — 75571 CT HRT W/O DYE W/CA TEST: CPT

## 2019-04-01 NOTE — LETTER
[facility]  [address] [place] [address] [facility]  [address]  [place]  [address]  Tel: [phone]    April 3, 2019    [place] [address]        Dear Mr. Feng,    Lesliemanav Rajiv,    This is to inform you regarding your test result.    The above result is satisfactory.  A separate letter was sent to you regarding your coronary calcium scoring test result  If you have any further questions or problems, please contact our office.      Sincerely,    Deidre Dubois MD/ Cheryl Love CMA  Results for orders placed or performed during the hospital encounter of 04/01/19   CT Coronary Calcium Scan    Narrative    Procedure: CT CALCIUM SCREENING     Examination Date: 4/1/2019 4:23 PM      Clinical Information: Screening for heart disease     Ordering Provider: Dr Bourgeois    PROCEDURE: High-resolution, ECG synchronized multi-slice computed  tomography was performed without incident. Coronary calcification was  analyzed using Opsona calcium scoring software. Scan protocol was  optimized to minimize radiation exposure. The total radiation exposure  was calculated to be 32 DLP and 0.4 mSv.    FINDINGS:    Overall quality of the study: Good.     CORONARY ARTERY CALCIUM SCORES:     Left main coronary artery: 0  Left anterior descending coronary artery: 0  Circumflex coronary artery: 0   Right coronary artery: 0    TOTAL CALCIUM SCORE: 0    The total Agatston calcium score is 0.     Impressions  No evidence of coronary artery calcification  Please review Radiology report for incidental noncardiac findings that  will follow separately      CALCIUM SCORE OF ZERO: A calcium score of zero places this individual  in the lowest quartile when compared to an age and gender matched  control group. If the patient has more than one cardiac risk factor  then the risk of coronary heart disease, death or myocardial  infarction is less than 0.4% per year (Alma Rosa P. et al. JAC,  2007; 49:378-402).     GENERAL  RECOMMENDATIONS: Adoption and maintenance of a healthy  lifestyle is recommended for all people. This should include regular,  appropriate exercise and observance of a proper diet, to ensure  balanced nutrition and weight control. Tobacco use should be avoided.  Cholesterol has been linked to coronary atherosclerosis, and we  strongly encourage adhering to the recommendations of the National  Cholesterol Education Panel (NCEP). For primary prevention, these  include a target goal for total cholesterol of less than 200 mg/dl,  HDL cholesterol of greater than 40 mg/dl, triglycerides of less than  150 mg/dl, and LDL cholesterol of less than 130 mg/dl. However note  that these are general recommendations only, and as with all such  matters, the personal physician should be consulted regarding  recommendations appropriate for the individual.    CLARIBEL MCCOY MD   Radiologist Consult For Cardiology    Narrative    RADIOLOGIST CONSULT FOR CARDIOLOGY April 1, 2019 at 1623 hours    HISTORY: 48-year-old patient with family history of heart disease,  screening examination.    COMPARISON: None.    TECHNIQUE: Axial and coronal noncontrast CT images obtained through  the heart.    FINDINGS: Please note this report will focus on soft tissue findings.  Please see separate report for all cardiac and vascular findings.    No abnormally enlarged mediastinal lymph nodes. The visible solid  organs in the upper abdomen are unremarkable. No acute osseous  abnormality. No pulmonary masses.    ELE RUELAS MD               Enclosure: Lab Results

## 2019-04-01 NOTE — LETTER
[place] [address] [facility]  [address]  [place]  [address]  Tel: [phone]    April 2, 2019    Rajiv Feng  [address]  [place] [address]        Dear Mr. Feng,    This is to inform you regarding your test result.    Your coronary calcium scoring test showed no evidence of coronary artery calcification.  So this is very good news  Continue eating healthy and regular physical activity.  If you have any further questions or problems, please contact our office.      Sincerely,    Mars Bourgeois MD/HUSSEIN

## 2019-04-02 NOTE — RESULT ENCOUNTER NOTE
Please notify patient by sending following letter with copy of test results      Kaylie Vance,    This is to inform you regarding your test result.    The above result is satisfactory.  A separate letter was sent to you regarding your coronary calcium scoring test result    Sincerely,      Dr.Nasima Sherly MD,FACP

## 2019-04-02 NOTE — RESULT ENCOUNTER NOTE
Please notify patient by sending following letter with copy of test results      Kaylie Vance,    This is to inform you regarding your test result.    Your coronary calcium scoring test showed no evidence of coronary artery calcification.  So this is very good news  Continue eating healthy and regular physical activity.    Sincerely,      Dr.Nasima Sherly MD,FACP

## 2019-04-23 ENCOUNTER — OFFICE VISIT (OUTPATIENT)
Dept: FAMILY MEDICINE | Facility: CLINIC | Age: 49
End: 2019-04-23
Payer: COMMERCIAL

## 2019-04-23 VITALS — TEMPERATURE: 98.7 F | DIASTOLIC BLOOD PRESSURE: 71 MMHG | SYSTOLIC BLOOD PRESSURE: 109 MMHG

## 2019-04-23 DIAGNOSIS — Z71.84 TRAVEL ADVICE ENCOUNTER: Primary | ICD-10-CM

## 2019-04-23 PROCEDURE — 99401 PREV MED CNSL INDIV APPRX 15: CPT | Mod: GA | Performed by: NURSE PRACTITIONER

## 2019-04-23 RX ORDER — AZITHROMYCIN 500 MG/1
500 TABLET, FILM COATED ORAL DAILY
Qty: 3 TABLET | Refills: 0 | Status: SHIPPED | OUTPATIENT
Start: 2019-04-23 | End: 2019-09-19

## 2019-04-23 NOTE — NURSING NOTE
"Chief Complaint   Patient presents with     [facility]     initial /71 (BP Location: Right arm)   Temp 98.7  F (37.1  C) (Oral)  Estimated body mass index is 25.73 kg/m  as calculated from the following:    Height as of 7/31/18: 1.854 m (6' 1\").    Weight as of 7/31/18: 88.5 kg (195 lb).  BP completed using cuff size: regular.  R arm      Health Maintenance that is potentially due pending provider review:  NONE    n/a    Yusuf Chua ma  "

## 2019-04-23 NOTE — PATIENT INSTRUCTIONS
Today April 23, 2019 you received the    NO Vaccines today.    These appointments can be made as a NURSE ONLY visit.    **It is very important for the vaccinations to be given on the scheduled day(s), this helps ensure you receive the full effectiveness of the vaccine.**    Please call [facility] with any questions [phone]    Thank you for visiting [facility]

## 2019-04-23 NOTE — PROGRESS NOTES
Nurse Note      Itinerary:  [place] and [place]      Departure Date: 5/9/19      Return Date: 5/26/19      Length of Trip 2 weeks      Reason for Travel: Tourism           Urban or rural: urban      Accommodations: Hotel    Private dwelling        IMMUNIZATION HISTORY  Have you received any immunizations within the past 4 weeks?  yes  Have you ever fainted from having your blood drawn or from an injection?  No  Have you ever had a fever reaction to vaccination?  No  Have you ever had any bad reaction or side effect from any vaccination?  No  Have you ever had hepatitis A or B vaccine?  Yes  Do you live (or work closely) with anyone who has AIDS, an AIDS-like condition, any other immune disorder or who is on chemotherapy for cancer?  No  Do you have a family history of immunodeficiency?  No  Have you received any injection of immune globulin or any blood products during the past 12 months?  No    Patient roomed by Yusuf Sanabria  Rajiv Feng is a 48 year old male seen today with spouse for counsultation for international travel to [place] and [facility] for Tourism.      Patient itinerary :  will be in the urban region of  [facility] and [facility] which presents risk for food borne illnesses and motor vehicle accidents. exposure.      Patient's activities will include sightseeing and visiting friends     Patient's country of birth is [place]  Special medical concerns: none  Pre-travel questionnaire was completed by patient and reviewed by provider.     Vitals: /71 (BP Location: Right arm)   Temp 98.7  F (37.1  C) (Oral)   BMI= There is no height or weight on file to calculate BMI.    EXAM:  General:  Well-nourished, well-developed in no acute distress.  Appears to be stated age, interacts appropriately and expresses understanding of information given to patient.    Current Outpatient Medications   Medication Sig Dispense Refill     finasteride (PROPECIA) 1 MG tablet Take 1 tablet (1 mg) by mouth  daily 90 tablet 3     valACYclovir (VALTREX) 1000 mg tablet Take 2 tablets (2,000 mg) by mouth 2 times daily 4 tablet 1     Patient Active Problem List   Diagnosis     SVEN (obstructive sleep apnea)     Allergies   Allergen Reactions     Broccoli [Brassica Oleracea Italica] Anaphylaxis     Penicillin G Anaphylaxis         Immunizations discussed include:   Hepatitis A:  Up to date  Hepatitis B: Up to date  Influenza: Up to date  Typhoid: Up to date  Rabies: Not indicated  Yellow Fever: Not indicated  Japanese Encephalitis: Not indicated  Meningococcus: Not indicated  Tetanus/Diphtheria: Up to date  Measles/Mumps/Rubella: Up to date  Cholera: Not needed  Polio: Up to date  Pneumococcal: Under age of 65  Varicella: Immune by disease history per patient report  Zostavax:  Not indicated  Shingrix: Not indicated  HPV:  Not indicated  TB:  Low risk     Altitude Exposure on this trip: no  Past tolerance to Altitude: na    ASSESSMENT/PLAN:    ICD-10-CM    1. Travel advice encounter Z71.89 azithromycin (ZITHROMAX) 500 MG tablet     I have reviewed general recommendations for safe travel   including: food/water precautions, insect precautions, safer sex   practices given high prevalence of Zika, HIV and other STDs,   roadway safety. Educational materials and [employer] report provided.    Malaraia prophylaxis recommended: none  Symptomatic treatment for traveler's diarrhea: azithromycin  Altitude illness prevention and treatment: none      Evacuation insurance advised and resources were provided to patient.    Total visit time 20 minutes  with over 50% of time spent counseling patient as detailed above.    Renay Lunsford CNP

## 2019-04-25 DIAGNOSIS — L65.9 ALOPECIA: ICD-10-CM

## 2019-04-26 RX ORDER — FINASTERIDE 1 MG/1
1 TABLET, FILM COATED ORAL DAILY
Qty: 90 TABLET | Refills: 0 | Status: SHIPPED | OUTPATIENT
Start: 2019-04-26 | End: 2019-07-24

## 2019-07-24 DIAGNOSIS — L65.9 ALOPECIA: ICD-10-CM

## 2019-07-24 NOTE — TELEPHONE ENCOUNTER
"  finasteride (PROPECIA) 1 MG tablet 90 tablet 0 4/26/2019         Last Written Prescription Date:  04/26/2019  Last Fill Quantity: 90,  # refills: 0   Last office visit: 05/03/2018 with prescribing provider:     Future Office Visit:   Spoke with patient and let him know that he needs to schedule an appointment with . He is going to call back to schedule.    Requested Prescriptions   Pending Prescriptions Disp Refills     finasteride (PROPECIA) 1 MG tablet [Pharmacy Med Name: FINASTERIDE 1 MG TABLET] 90 tablet 0     Sig: TAKE 1 TABLET BY MOUTH EVERY DAY. NEEDS APPT FOR REFILLS       Miscellaneous Dermatologic Agents Failed - 7/24/2019  9:16 AM        Failed - Recent (12 mo) or future (30 days) visit within the authorizing provider's specialty     Patient had office visit in the last 12 months or has a visit in the next 30 days with authorizing provider or within the authorizing provider's specialty.  See \"Patient Info\" tab in inbasket, or \"Choose Columns\" in Meds & Orders section of the refill encounter.              Failed - Refill request is not for Imiquimod, 5-Fluorouracil, or Finasteride      If Imiquimod, 5-Fluorouracil, or Finasteride, may refill if indicated in progress notes.           Passed - Medication is active on med list        Passed - Patient is 24 mos old or older          "

## 2019-07-29 RX ORDER — FINASTERIDE 1 MG/1
TABLET, FILM COATED ORAL
Qty: 15 TABLET | Refills: 0 | Status: SHIPPED | OUTPATIENT
Start: 2019-07-29 | End: 2019-08-16

## 2019-07-29 NOTE — TELEPHONE ENCOUNTER
Routing refill request to provider for review/approval because:  Patient needs to be seen because it has been more than 1 year since last office visit. (pt has been notified, and is aware that he needs to schedule)      Failed - Refill request is not for Imiquimod, 5-Fluorouracil, or Finasteride       Please review and authorize if appropriate,     Thank you,   Nimisha MONTIEL RN

## 2019-08-07 DIAGNOSIS — L65.9 ALOPECIA: ICD-10-CM

## 2019-08-07 NOTE — TELEPHONE ENCOUNTER
"Requested Prescriptions   Pending Prescriptions Disp Refills     finasteride (PROPECIA) 1 MG tablet [Pharmacy Med Name: FINASTERIDE 1 MG TABLET] 15 tablet 0     Sig: TAKE 1 TABLET BY MOUTH EVERY DAY **NEED APPT FOR REFILLS**  Last Written Prescription Date:  7/29/19  Last Fill Quantity: 15 tab,  # refills: 0   Last office visit: 4/23/2019 with prescribing provider:  Jonn   Future Office Visit:         Miscellaneous Dermatologic Agents Failed - 8/7/2019  7:37 AM        Failed - Recent (12 mo) or future (30 days) visit within the authorizing provider's specialty     Patient had office visit in the last 12 months or has a visit in the next 30 days with authorizing provider or within the authorizing provider's specialty.  See \"Patient Info\" tab in inbasket, or \"Choose Columns\" in Meds & Orders section of the refill encounter.              Failed - Refill request is not for Imiquimod, 5-Fluorouracil, or Finasteride      If Imiquimod, 5-Fluorouracil, or Finasteride, may refill if indicated in progress notes.           Passed - Medication is active on med list        Passed - Patient is 24 mos old or older          "

## 2019-08-08 RX ORDER — FINASTERIDE 1 MG/1
TABLET, FILM COATED ORAL
Qty: 15 TABLET | Refills: 0 | OUTPATIENT
Start: 2019-08-08

## 2019-08-16 DIAGNOSIS — L65.9 ALOPECIA: ICD-10-CM

## 2019-08-16 RX ORDER — FINASTERIDE 1 MG/1
1 TABLET, FILM COATED ORAL DAILY
Qty: 30 TABLET | Refills: 1 | Status: SHIPPED | OUTPATIENT
Start: 2019-08-16 | End: 2019-09-19

## 2019-08-16 NOTE — TELEPHONE ENCOUNTER
To PCP:    Routing refill request to provider for review/approval because:  Pt scheduled a f/u visit, OK to send add'l refill? 9/19/19    Thank you,   Nimisha CHAMBERLAIN RN

## 2019-08-16 NOTE — TELEPHONE ENCOUNTER
"Pending Prescriptions:                       Disp   Refills    finasteride (PROPECIA) 1 MG tablet [Pharm*15 tab*0            Sig: TAKE 1 TABLET BY MOUTH EVERY DAY **NEED APPT FOR           REFILLS**    .Last Written Prescription Date:  07/29/2019  Last Fill Quantity: 15,  # refills: 0   Last office visit: 05/03/2018 with prescribing provider:     Future Office Visit:    Requested Prescriptions   Pending Prescriptions Disp Refills     finasteride (PROPECIA) 1 MG tablet [Pharmacy Med Name: FINASTERIDE 1 MG TABLET] 15 tablet 0     Sig: TAKE 1 TABLET BY MOUTH EVERY DAY **NEED APPT FOR REFILLS**       Miscellaneous Dermatologic Agents Failed - 8/16/2019  9:11 AM        Failed - Recent (12 mo) or future (30 days) visit within the authorizing provider's specialty     Patient had office visit in the last 12 months or has a visit in the next 30 days with authorizing provider or within the authorizing provider's specialty.  See \"Patient Info\" tab in inbasket, or \"Choose Columns\" in Meds & Orders section of the refill encounter.              Failed - Refill request is not for Imiquimod, 5-Fluorouracil, or Finasteride      If Imiquimod, 5-Fluorouracil, or Finasteride, may refill if indicated in progress notes.           Passed - Medication is active on med list        Passed - Patient is 24 mos old or older          "

## 2019-08-16 NOTE — TELEPHONE ENCOUNTER
TC's please contact pt to assist with scheduling. Pt given a sadie on 7/29    Thank you!  Nimisha CHAMBERLAIN RN

## 2019-09-14 DIAGNOSIS — L65.9 ALOPECIA: ICD-10-CM

## 2019-09-14 NOTE — TELEPHONE ENCOUNTER
"Last Written Prescription Date:  8/16/19  Last Fill Quantity: 30 tablet,  # refills: 1   Last office visit: 5/3/2018 with prescribing provider:  Christelle   Future Office Visit:   Next 5 appointments (look out 90 days)    Sep 19, 2019  6:00 PM CDT  Office Visit with Mars Bourgeois MD  [facility] [address] [facility] [id number] [phone]         Requested Prescriptions   Pending Prescriptions Disp Refills     finasteride (PROPECIA) 1 MG tablet [Pharmacy Med Name: FINASTERIDE 1 MG TABLET] 30 tablet 1     Sig: TAKE 1 TABLET BY MOUTH EVERY DAY       Miscellaneous Dermatologic Agents Failed - 9/14/2019  9:33 AM        Failed - Refill request is not for Imiquimod, 5-Fluorouracil, or Finasteride      If Imiquimod, 5-Fluorouracil, or Finasteride, may refill if indicated in progress notes.           Passed - Recent (12 mo) or future (30 days) visit within the authorizing provider's specialty     Patient had office visit in the last 12 months or has a visit in the next 30 days with authorizing provider or within the authorizing provider's specialty.  See \"Patient Info\" tab in inbasket, or \"Choose Columns\" in Meds & Orders section of the refill encounter.              Passed - Medication is active on med list        Passed - Patient is 24 mos old or older          "

## 2019-09-16 RX ORDER — FINASTERIDE 1 MG/1
TABLET, FILM COATED ORAL
Start: 2019-09-16

## 2019-09-19 ENCOUNTER — OFFICE VISIT (OUTPATIENT)
Dept: FAMILY MEDICINE | Facility: CLINIC | Age: 49
End: 2019-09-19
Payer: COMMERCIAL

## 2019-09-19 VITALS
HEIGHT: 72 IN | WEIGHT: 209 LBS | SYSTOLIC BLOOD PRESSURE: 116 MMHG | DIASTOLIC BLOOD PRESSURE: 70 MMHG | BODY MASS INDEX: 28.31 KG/M2 | HEART RATE: 80 BPM | TEMPERATURE: 97.4 F | OXYGEN SATURATION: 97 %

## 2019-09-19 DIAGNOSIS — G47.33 OSA (OBSTRUCTIVE SLEEP APNEA): ICD-10-CM

## 2019-09-19 DIAGNOSIS — Z00.00 ROUTINE GENERAL MEDICAL EXAMINATION AT A HEALTH CARE FACILITY: Primary | ICD-10-CM

## 2019-09-19 DIAGNOSIS — L65.9 ALOPECIA: ICD-10-CM

## 2019-09-19 DIAGNOSIS — Z12.11 COLON CANCER SCREENING: ICD-10-CM

## 2019-09-19 DIAGNOSIS — Z12.5 PROSTATE CANCER SCREENING: ICD-10-CM

## 2019-09-19 PROCEDURE — 99396 PREV VISIT EST AGE 40-64: CPT | Performed by: INTERNAL MEDICINE

## 2019-09-19 RX ORDER — FINASTERIDE 1 MG/1
1 TABLET, FILM COATED ORAL DAILY
Qty: 90 TABLET | Refills: 3 | Status: SHIPPED | OUTPATIENT
Start: 2019-09-19 | End: 2020-09-10

## 2019-09-19 ASSESSMENT — MIFFLIN-ST. JEOR: SCORE: 1851.02

## 2019-09-19 NOTE — PROGRESS NOTES
SUBJECTIVE:   CC: Rajiv Feng is an 49 year old male who presents for preventative health visit.     Healthy Habits:     Getting at least 3 servings of Calcium per day:  Yes    Bi-annual eye exam:  Yes    Dental care twice a year:  NO    Sleep apnea or symptoms of sleep apnea:  Sleep apnea    Diet:  Regular (no restrictions)    Frequency of exercise:  None    Duration of exercise:  N/A    Taking medications regularly:  Yes    Barriers to taking medications:  None    Medication side effects:  None    PHQ-2 Total Score: 0    Additional concerns today:  No    Today's PHQ-2 Score:   PHQ-2 ( 1999 Pfizer) 9/19/2019   Q1: Little interest or pleasure in doing things 0   Q2: Feeling down, depressed or hopeless 0   PHQ-2 Score 0       Abuse: Current or Past(Physical, Sexual or Emotional)- No  Do you feel safe in your environment? Yes    Social History     Tobacco Use     Smoking status: Never Smoker     Smokeless tobacco: Never Used   Substance Use Topics     Alcohol use: No     If you drink alcohol do you typically have >3 drinks per day or >7 drinks per week? No    Alcohol Use 7/7/2017   Prescreen: >3 drinks/day or >7 drinks/week? The patient does not drink >3 drinks per day nor >7 drinks per week.   No flowsheet data found.    Last PSA:   PSA   Date Value Ref Range Status   03/30/2018 0.63 0 - 4 ug/L Final     Comment:     Assay Method:  Chemiluminescence using Siemens Vista analyzer       Reviewed orders with patient. Reviewed health maintenance and updated orders accordingly - Yes  Patient Active Problem List   Diagnosis     SVEN (obstructive sleep apnea)     Past Surgical History:   Procedure Laterality Date     EYE SURGERY      prk     NO HISTORY OF SURGERY       wisdom teeth         Social History     Tobacco Use     Smoking status: Never Smoker     Smokeless tobacco: Never Used   Substance Use Topics     Alcohol use: No     Family History   Problem Relation Age of Onset     Vascular Disease Mother         smoker       Thyroid Disease Mother      Lung Cancer Father      Sleep Apnea Brother      Sleep Apnea Brother          Current Outpatient Medications   Medication Sig Dispense Refill     finasteride (PROPECIA) 1 MG tablet Take 1 tablet (1 mg) by mouth daily 30 tablet 1     valACYclovir (VALTREX) 1000 mg tablet Take 2 tablets (2,000 mg) by mouth 2 times daily 4 tablet 1     Allergies   Allergen Reactions     Broccoli [Brassica Oleracea Italica] Anaphylaxis     Penicillin G Anaphylaxis       Reviewed and updated as needed this visit by clinical staff  Tobacco  Allergies  Meds         Reviewed and updated as needed this visit by Provider        Past Medical History:   Diagnosis Date     SVEN (obstructive sleep apnea) 7/7/2017      Past Surgical History:   Procedure Laterality Date     EYE SURGERY      prk     NO HISTORY OF SURGERY       wisdom teeth         Review of Systems  A 10 organ systems ROS is negative.     OBJECTIVE:   /70 (BP Location: Right arm, Patient Position: Sitting, Cuff Size: Adult Regular)   Pulse 80   Temp 97.4  F (36.3  C) (Oral)   Ht 1.829 m (6')   Wt 94.8 kg (209 lb)   SpO2 97%   BMI 28.35 kg/m      Physical Exam  GENERAL: healthy, alert and no distress  EYES: Eyes grossly normal to inspection, PERRL and conjunctivae and sclerae normal  HENT: ear canals and TM's normal, nose and mouth without ulcers or lesions  NECK: no adenopathy, no asymmetry, masses, or scars and thyroid normal to palpation  RESP: lungs clear to auscultation - no rales, rhonchi or wheezes  CV: regular rate and rhythm, normal S1 S2, no S3 or S4, no murmur, click or rub, no peripheral edema and peripheral pulses strong  ABDOMEN: soft, nontender, no hepatosplenomegaly, no masses and bowel sounds normal  RECTAL: normal sphincter tone, no rectal masses, prostate normal size, smooth, nontender without nodules or masses  :  No inguinal hernias, no testicular masses   MS: no gross musculoskeletal defects noted, no edema  SKIN: no  suspicious lesions or rashes  NEURO: Normal strength and tone, mentation intact and speech normal  PSYCH: mentation appears normal, affect normal/bright        ASSESSMENT/PLAN:   1. Routine general medical examination at a health care facility    - HIV Screening; Future  - Comprehensive metabolic panel; Future  - Lipid panel reflex to direct LDL Fasting; Future    2. Alopecia    - finasteride (PROPECIA) 1 MG tablet; Take 1 tablet (1 mg) by mouth daily  Dispense: 90 tablet; Refill: 3    3. SVEN (obstructive sleep apnea)      4. Prostate cancer screening    - Prostate spec antigen screen; Future    5. Colon cancer screening    - Fecal colorectal cancer screen FIT; Future    COUNSELING:   Reviewed preventive health counseling, as reflected in patient instructions  Special attention given to:        Regular exercise       Healthy diet/nutrition       Immunizations    Declined: Influenza due to Other getting at work              HIV screeninx in teen years, 1x in adult years, and at intervals if high risk       Colon cancer screening; FIT test       Prostate cancer screening; PSA today     Estimated body mass index is 28.35 kg/m  as calculated from the following:    Height as of this encounter: 1.829 m (6').    Weight as of this encounter: 94.8 kg (209 lb).     Weight management plan: Discussed healthy diet and exercise guidelines     reports that he has never smoked. He has never used smokeless tobacco.      Counseling Resources:  ATP IV Guidelines  Pooled Cohorts Equation Calculator  FRAX Risk Assessment  ICSI Preventive Guidelines  Dietary Guidelines for Americans,   USDA's MyPlate  ASA Prophylaxis  Lung CA Screening    Mars Bourgeois MD  [facility]

## 2019-09-20 DIAGNOSIS — Z00.00 ROUTINE GENERAL MEDICAL EXAMINATION AT A HEALTH CARE FACILITY: ICD-10-CM

## 2019-09-20 DIAGNOSIS — Z12.5 PROSTATE CANCER SCREENING: ICD-10-CM

## 2019-09-20 LAB
ALBUMIN SERPL-MCNC: 3.9 G/DL (ref 3.4–5)
ALP SERPL-CCNC: 62 U/L (ref 40–150)
ALT SERPL W P-5'-P-CCNC: 68 U/L (ref 0–70)
ANION GAP SERPL CALCULATED.3IONS-SCNC: 7 MMOL/L (ref 3–14)
AST SERPL W P-5'-P-CCNC: 25 U/L (ref 0–45)
BILIRUB SERPL-MCNC: 0.6 MG/DL (ref 0.2–1.3)
BUN SERPL-MCNC: 13 MG/DL (ref 7–30)
CALCIUM SERPL-MCNC: 9 MG/DL (ref 8.5–10.1)
CHLORIDE SERPL-SCNC: 108 MMOL/L (ref 94–109)
CHOLEST SERPL-MCNC: 191 MG/DL
CO2 SERPL-SCNC: 25 MMOL/L (ref 20–32)
CREAT SERPL-MCNC: 0.96 MG/DL (ref 0.66–1.25)
GFR SERPL CREATININE-BSD FRML MDRD: >90 ML/MIN/{1.73_M2}
GLUCOSE SERPL-MCNC: 90 MG/DL (ref 70–99)
HDLC SERPL-MCNC: 40 MG/DL
HIV 1+2 AB+HIV1 P24 AG SERPL QL IA: NONREACTIVE
LDLC SERPL CALC-MCNC: 134 MG/DL
NONHDLC SERPL-MCNC: 151 MG/DL
POTASSIUM SERPL-SCNC: 4.1 MMOL/L (ref 3.4–5.3)
PROT SERPL-MCNC: 6.8 G/DL (ref 6.8–8.8)
PSA SERPL-ACNC: 0.37 UG/L (ref 0–4)
SODIUM SERPL-SCNC: 140 MMOL/L (ref 133–144)
TRIGL SERPL-MCNC: 84 MG/DL

## 2019-09-20 PROCEDURE — G0103 PSA SCREENING: HCPCS | Performed by: INTERNAL MEDICINE

## 2019-09-20 PROCEDURE — 80061 LIPID PANEL: CPT | Performed by: INTERNAL MEDICINE

## 2019-09-20 PROCEDURE — 80053 COMPREHEN METABOLIC PANEL: CPT | Performed by: INTERNAL MEDICINE

## 2019-09-20 PROCEDURE — 87389 HIV-1 AG W/HIV-1&-2 AB AG IA: CPT | Performed by: INTERNAL MEDICINE

## 2019-09-20 PROCEDURE — 36415 COLL VENOUS BLD VENIPUNCTURE: CPT | Performed by: INTERNAL MEDICINE

## 2019-09-20 NOTE — LETTER
[address] [facility]  [address]  [place]  [address]  Tel: [phone]    September 23, 2019    Rajiv eFng  [address] [place] [address]      Dear Rajiv,     The following letter pertains to your most recent diagnostic tests:     -Your HIV test is negative.     -Your cholesterol panel looks healthy.  Your goal LDL ('bad cholesterol') level is that less than 160 based on your statistical risk for heart attack and vascular disease.         -Liver and gallbladder tests are normal for you. (ALT,AST, Alk phos, bilirubin), kidney function is normal for you (Creatinine, GFR), Sodium is normal, Potassium is normal for you, Calcium is normal for you, Glucose (blood sugar) is normal for you.       -Your prostate specific antigen (PSA) test result returned normal.     -Your complete blood counts including your hemoglobin returned normal for you.   j           Bottom line:  Lab results look stable and at goal.         Follow up:  Schedule an appointment for a physical examination with fasting blood tests in one year's time, or return sooner if new questions, symptoms or problems arise.   If you have any further questions or problems, please contact our office.      Sincerely,    Mars Bourgeois MD / ernie        Resulted Orders   Prostate spec antigen screen   Result Value Ref Range    PSA 0.37 0 - 4 ug/L      Comment:      Assay Method:  Chemiluminescence using Siemens Vista analyzer   Lipid panel reflex to direct LDL Fasting   Result Value Ref Range    Cholesterol 191 <200 mg/dL    Triglycerides 84 <150 mg/dL      Comment:      Fasting specimen    HDL Cholesterol 40 >39 mg/dL    LDL Cholesterol Calculated 134 (H) <100 mg/dL      Comment:      Above desirable:  100-129 mg/dl  Borderline High:  130-159 mg/dL  High:             160-189 mg/dL  Very high:       >189 mg/dl      Non HDL Cholesterol 151 (H) <130 mg/dL      Comment:      Above Desirable:  130-159 mg/dl  Borderline high:  160-189  mg/dl  High:             190-219 mg/dl  Very high:       >219 mg/dl     Comprehensive metabolic panel   Result Value Ref Range    Sodium 140 133 - 144 mmol/L    Potassium 4.1 3.4 - 5.3 mmol/L    Chloride 108 94 - 109 mmol/L    Carbon Dioxide 25 20 - 32 mmol/L    Anion Gap 7 3 - 14 mmol/L    Glucose 90 70 - 99 mg/dL      Comment:      Fasting specimen    Urea Nitrogen 13 7 - 30 mg/dL    Creatinine 0.96 0.66 - 1.25 mg/dL    GFR Estimate >90 >60 mL/min/[1.73_m2]      Comment:      Non  GFR Calc  Starting 12/18/2018, serum creatinine based estimated GFR (eGFR) will be   calculated using the Chronic Kidney Disease Epidemiology Collaboration   (CKD-EPI) equation.      GFR Estimate If Black >90 >60 mL/min/[1.73_m2]      Comment:       GFR Calc  Starting 12/18/2018, serum creatinine based estimated GFR (eGFR) will be   calculated using the Chronic Kidney Disease Epidemiology Collaboration   (CKD-EPI) equation.      Calcium 9.0 8.5 - 10.1 mg/dL    Bilirubin Total 0.6 0.2 - 1.3 mg/dL    Albumin 3.9 3.4 - 5.0 g/dL    Protein Total 6.8 6.8 - 8.8 g/dL    Alkaline Phosphatase 62 40 - 150 U/L    ALT 68 0 - 70 U/L    AST 25 0 - 45 U/L   HIV Screening   Result Value Ref Range    HIV Antigen Antibody Combo Nonreactive NR^Nonreactive          Comment:      HIV-1 p24 Ag & HIV-1/HIV-2 Ab Not Detected

## 2019-09-21 NOTE — RESULT ENCOUNTER NOTE
The following letter pertains to your most recent diagnostic tests:    -Your HIV test is negative.     -Your cholesterol panel looks healthy.  Your goal LDL ('bad cholesterol') level is that less than 160 based on your statistical risk for heart attack and vascular disease.        -Liver and gallbladder tests are normal for you. (ALT,AST, Alk phos, bilirubin), kidney function is normal for you (Creatinine, GFR), Sodium is normal, Potassium is normal for you, Calcium is normal for you, Glucose (blood sugar) is normal for you.      -Your prostate specific antigen (PSA) test result returned normal.     -Your complete blood counts including your hemoglobin returned normal for you.   j           Bottom line:  Lab results look stable and at goal.        Follow up:  Schedule an appointment for a physical examination with fasting blood tests in one year's time, or return sooner if new questions, symptoms or problems arise.       Sincerely,    Dr. Bourgeois

## 2020-02-06 DIAGNOSIS — Z12.11 COLON CANCER SCREENING: ICD-10-CM

## 2020-02-06 PROCEDURE — 82274 ASSAY TEST FOR BLOOD FECAL: CPT | Performed by: INTERNAL MEDICINE

## 2020-02-06 NOTE — LETTER
[address] [facility]  [address]  [place]  [address]  Tel: [phone]    February 11, 2020    Rajiv Feng  [address] [place] [address]        Dear Mr. Feng,    The following letter pertains to your most recent diagnostic tests:     Good news! Your stool test for colon cancer screening is negative.  This should be repeated in one year.       Sincerely,     Dr. Bourgeois  / esa       Results for orders placed or performed in visit on 02/06/20   Fecal colorectal cancer screen FIT     Status: None   Result Value Ref Range    Occult Blood Scn FIT Negative NEG^Negative

## 2020-02-10 LAB — HEMOCCULT STL QL IA: NEGATIVE

## 2020-02-11 NOTE — RESULT ENCOUNTER NOTE
The following letter pertains to your most recent diagnostic tests:    Good news! Your stool test for colon cancer screening is negative.  This should be repeated in one year.      Sincerely,    Dr. Bourgeois

## 2020-03-10 ENCOUNTER — HEALTH MAINTENANCE LETTER (OUTPATIENT)
Age: 50
End: 2020-03-10

## 2020-10-02 DIAGNOSIS — L65.9 ALOPECIA: ICD-10-CM

## 2020-10-02 NOTE — TELEPHONE ENCOUNTER
Routing refill request to provider for review/approval because:  Patient needs to be seen because it has been more than 1 year since last office visit.    TCs: Please call pt to help schedule appt and route back to triage.     Thank you,  Delmer JOSEPH RN

## 2020-12-05 DIAGNOSIS — L65.9 ALOPECIA: ICD-10-CM

## 2020-12-07 RX ORDER — FINASTERIDE 1 MG/1
TABLET, FILM COATED ORAL
Qty: 30 TABLET | Refills: 1 | Status: SHIPPED | OUTPATIENT
Start: 2020-12-07 | End: 2020-12-18

## 2020-12-07 NOTE — TELEPHONE ENCOUNTER
HEART FAILURE  You have heart failure.  This means that your heart muscle is weakened and cannot pump blood the way it should.    MEDICATIONS:  · See Medication List (keep list up to date and bring it to your doctor appointments).  · Talk to your doctor if you have problems getting or taking medications.    VACCINES:  Most Recent Immunizations   Administered Date(s) Administered   • Influenza, high dose seasonal, preservative-free 09/17/2018   • Influenza, injectable, quadrivalent 11/06/2017   • Influenza, injectable, quadrivalent, preservative-free 09/20/2016   • Influenza, seasonal, injectable, trivalent 10/04/2012   • Pneumococcal Conjugate 13 valent 07/21/2016   • Pneumococcal polysaccharide, adult, 23 valent 09/17/2018   • Td:Adult type tetanus/diphtheria 12/14/2009   • Tdap 02/20/2013   • Zoster Shingles 10/15/2014   ·     ACTIVITY:  · Continue activity as you were doing in the hospital.  You can slowly increase to what you were doing before you were hospitalized.  · Balance activity with rest.  · Elevate legs when resting.  · Daily Weight:  Weigh yourself first thing every morning (at the same time with same amount of clothes on).  · Keep a record of your weights, and bring it to your doctor appointments.    SMOKING:  · Avoid all tobacco products and secondhand smoke.  · Smoking Cessation Counseling offered.  · [place] Toll Free Quit Line: [phone]    LOW SALT (SODIUM) DIET:  · Limit salt to help prevent fluid build-up in your body.  This will help prevent shortness of breath and swelling of feet and ankles.  · Avoid adding salt to food at the table and use products labeled \"low sodium\" or \"no salt\" (<300 mg per serving).  · Avoid michael salt foods like canned soup, sauces, bouillon, lunch meat, cheese, fast food, salty snacks, canned and packaged foods.    HEART FAILURE ACTION PLAN:  · Use this plan to help you decide when to change your routine or call the doctor.    CALL 911 IF YOU:  · Have pain or  Routing refill request to provider for review/approval because:  Refill doesn't pass protocol as medication is Finasteride    Please review and authorize if appropriate.    Thank you,   Delmer KNOWLES RN   tightness in my chest.  · Have trouble breathing.  · Have dizzy spells or feel faint.  · Feel anxious or like something bad will happen.    CONTACT YOUR DOCTOR (even on weekends and holidays) IF YOU:  · Have to sleep sitting up.  · Start coughing at night.  · Notice swelling in your ankles or any part of your body.  · Lose your appetite.  · Gain more than 3 pounds in 1-2 days or 5 pounds in a week.  · Lose more than 5 pounds in 2 days.  · Become tired faster or feel yourself losing energy.  · Have noisy breathing.    ________________________________________________  TRANSITIONAL CARE PROGRAM:  As part of our commitment to your overall care and recovery, we would like to check on you after your discharge.  You may receive intermittent non-urgent calls for approximately 30 days after your discharge to:     -Check on your recovery  -Assist with arranging follow-up appointments  -Answer prescription related questions  -Assist with other questions that you may have about your care    We understand that you are in your recovery period and these calls should be brief.      Please call me if you have any non-emergent questions or concerns. Please call MD office for emergent questions or concerns.  Seek emergency assistance (911) for severe symptoms.      Elinor Valencia  Transitional Care RN  Hours: Monday-Friday 7:30 am to 4:00 PM  Phone: [phone]    ____________________________________________________

## 2020-12-15 RX ORDER — FINASTERIDE 1 MG/1
TABLET, FILM COATED ORAL
Qty: 30 TABLET | Refills: 0 | OUTPATIENT
Start: 2020-12-15

## 2020-12-15 NOTE — TELEPHONE ENCOUNTER
This refill request is a duplicate request, previously received or sent.  Sent denial notification to pharmacy.  Cece Martinez, Triage RN  [facility]

## 2020-12-18 ENCOUNTER — OFFICE VISIT (OUTPATIENT)
Dept: FAMILY MEDICINE | Facility: CLINIC | Age: 50
End: 2020-12-18
Payer: COMMERCIAL

## 2020-12-18 VITALS
OXYGEN SATURATION: 97 % | HEIGHT: 72 IN | TEMPERATURE: 97.9 F | BODY MASS INDEX: 29.93 KG/M2 | HEART RATE: 86 BPM | DIASTOLIC BLOOD PRESSURE: 78 MMHG | WEIGHT: 221 LBS | SYSTOLIC BLOOD PRESSURE: 110 MMHG

## 2020-12-18 DIAGNOSIS — E78.1 HIGH BLOOD TRIGLYCERIDES: ICD-10-CM

## 2020-12-18 DIAGNOSIS — Z00.00 ROUTINE GENERAL MEDICAL EXAMINATION AT A HEALTH CARE FACILITY: Primary | ICD-10-CM

## 2020-12-18 DIAGNOSIS — G47.33 OSA (OBSTRUCTIVE SLEEP APNEA): ICD-10-CM

## 2020-12-18 DIAGNOSIS — Z12.11 COLON CANCER SCREENING: ICD-10-CM

## 2020-12-18 DIAGNOSIS — Z12.5 PROSTATE CANCER SCREENING: ICD-10-CM

## 2020-12-18 DIAGNOSIS — Z86.19 H/O COLD SORES: ICD-10-CM

## 2020-12-18 DIAGNOSIS — L65.9 ALOPECIA: ICD-10-CM

## 2020-12-18 DIAGNOSIS — N52.9 MALE ERECTILE DISORDER: ICD-10-CM

## 2020-12-18 DIAGNOSIS — R79.89 LFT ELEVATION: ICD-10-CM

## 2020-12-18 LAB
ERYTHROCYTE [DISTWIDTH] IN BLOOD BY AUTOMATED COUNT: 13.2 % (ref 10–15)
HCT VFR BLD AUTO: 45.8 % (ref 40–53)
HGB BLD-MCNC: 15.2 G/DL (ref 13.3–17.7)
MCH RBC QN AUTO: 29.1 PG (ref 26.5–33)
MCHC RBC AUTO-ENTMCNC: 33.2 G/DL (ref 31.5–36.5)
MCV RBC AUTO: 88 FL (ref 78–100)
PLATELET # BLD AUTO: 198 10E9/L (ref 150–450)
RBC # BLD AUTO: 5.22 10E12/L (ref 4.4–5.9)
WBC # BLD AUTO: 8.5 10E9/L (ref 4–11)

## 2020-12-18 PROCEDURE — 99396 PREV VISIT EST AGE 40-64: CPT | Performed by: INTERNAL MEDICINE

## 2020-12-18 PROCEDURE — 80061 LIPID PANEL: CPT | Performed by: INTERNAL MEDICINE

## 2020-12-18 PROCEDURE — 36415 COLL VENOUS BLD VENIPUNCTURE: CPT | Performed by: INTERNAL MEDICINE

## 2020-12-18 PROCEDURE — 80053 COMPREHEN METABOLIC PANEL: CPT | Performed by: INTERNAL MEDICINE

## 2020-12-18 PROCEDURE — 85027 COMPLETE CBC AUTOMATED: CPT | Performed by: INTERNAL MEDICINE

## 2020-12-18 PROCEDURE — G0103 PSA SCREENING: HCPCS | Performed by: INTERNAL MEDICINE

## 2020-12-18 RX ORDER — FINASTERIDE 1 MG/1
1 TABLET, FILM COATED ORAL DAILY
Qty: 90 TABLET | Refills: 3 | Status: SHIPPED | OUTPATIENT
Start: 2020-12-18 | End: 2021-01-05

## 2020-12-18 RX ORDER — VALACYCLOVIR HYDROCHLORIDE 1 G/1
2000 TABLET, FILM COATED ORAL 2 TIMES DAILY
Qty: 4 TABLET | Refills: 1 | Status: SHIPPED | OUTPATIENT
Start: 2020-12-18 | End: 2021-07-06

## 2020-12-18 RX ORDER — SILDENAFIL 25 MG/1
25 TABLET, FILM COATED ORAL DAILY PRN
Qty: 12 TABLET | Refills: 11 | Status: SHIPPED | OUTPATIENT
Start: 2020-12-18 | End: 2022-02-07

## 2020-12-18 ASSESSMENT — MIFFLIN-ST. JEOR: SCORE: 1900.45

## 2020-12-18 NOTE — PROGRESS NOTES
SUBJECTIVE:   CC: Rajiv Feng is an 50 year old male who presents for preventative health visit.       Patient has been advised of split billing requirements and indicates understanding: Yes  Healthy Habits:     Getting at least 3 servings of Calcium per day:  Yes    Bi-annual eye exam:  NO    Dental care twice a year:  NO    Sleep apnea or symptoms of sleep apnea:  Sleep apnea    Diet:  Regular (no restrictions)    Frequency of exercise:  2-3 days/week    Duration of exercise:  15-30 minutes    Taking medications regularly:  Yes    Medication side effects:  None    PHQ-2 Total Score: 0    Additional concerns today:  Yes      Today's PHQ-2 Score:   PHQ-2 ( 1999 Pfizer) 12/15/2020   Q1: Little interest or pleasure in doing things 0   Q2: Feeling down, depressed or hopeless 0   PHQ-2 Score 0   Q1: Little interest or pleasure in doing things Not at all   Q2: Feeling down, depressed or hopeless Not at all   PHQ-2 Score 0       Abuse: Current or Past(Physical, Sexual or Emotional)- No  Do you feel safe in your environment? Yes        Social History     Tobacco Use     Smoking status: Never Smoker     Smokeless tobacco: Never Used   Substance Use Topics     Alcohol use: No     If you drink alcohol do you typically have >3 drinks per day or >7 drinks per week? Not applicable    Alcohol Use 12/15/2020   Prescreen: >3 drinks/day or >7 drinks/week? Not Applicable   Prescreen: >3 drinks/day or >7 drinks/week? -       Last PSA:   PSA   Date Value Ref Range Status   09/20/2019 0.37 0 - 4 ug/L Final     Comment:     Assay Method:  Chemiluminescence using Siemens Vista analyzer       Reviewed orders with patient. Reviewed health maintenance and updated orders accordingly - Yes  Patient Active Problem List   Diagnosis     SVEN (obstructive sleep apnea)     Past Surgical History:   Procedure Laterality Date     EYE SURGERY      prk     NO HISTORY OF SURGERY       wisdom teeth         Social History     Tobacco Use     Smoking  status: Never Smoker     Smokeless tobacco: Never Used   Substance Use Topics     Alcohol use: No     Family History   Problem Relation Age of Onset     Vascular Disease Mother         smoker      Thyroid Disease Mother      Lung Cancer Father      Sleep Apnea Brother      Sleep Apnea Brother          Current Outpatient Medications   Medication Sig Dispense Refill     finasteride (PROPECIA) 1 MG tablet TAKE 1 TABLET BY MOUTH EVERY DAY 30 tablet 1     sildenafil (VIAGRA) 25 MG tablet Take 1 tablet (25 mg) by mouth daily as needed (ED) 12 tablet 11     valACYclovir (VALTREX) 1000 mg tablet Take 2 tablets (2,000 mg) by mouth 2 times daily 4 tablet 1     Allergies   Allergen Reactions     Broccoli [Brassica Oleracea Italica] Anaphylaxis     Penicillin G Anaphylaxis     Pt's brother is allergic and was told not to take       Reviewed and updated as needed this visit by clinical staff  Tobacco  Allergies  Meds              Reviewed and updated as needed this visit by Provider                Past Medical History:   Diagnosis Date     SVEN (obstructive sleep apnea) 7/7/2017      Past Surgical History:   Procedure Laterality Date     EYE SURGERY      prk     NO HISTORY OF SURGERY       wisdom teeth         Review of Systems  A 10 organ systems ROS is negative other than any pertinent positives or negatives previously stated.     OBJECTIVE:   /78 (BP Location: Right arm, Cuff Size: Adult Large)   Pulse 86   Temp 97.9  F (36.6  C) (Temporal)   Ht 1.829 m (6')   Wt 100.2 kg (221 lb)   SpO2 97%   BMI 29.97 kg/m      Physical Exam  GENERAL: healthy, alert and no distress  EYES: Eyes grossly normal to inspection, PERRL and conjunctivae and sclerae normal  HENT: ear canals and TM's normal, nose and mouth without ulcers or lesions  NECK: no adenopathy, no asymmetry, masses, or scars and thyroid normal to palpation  RESP: lungs clear to auscultation - no rales, rhonchi or wheezes  CV: regular rate and rhythm, normal S1  S2, no S3 or S4, no murmur, click or rub, no peripheral edema and peripheral pulses strong  ABDOMEN: soft, nontender, no hepatosplenomegaly, no masses and bowel sounds normal  RECTAL: normal sphincter tone, no rectal masses, prostate normal size, smooth, nontender without nodules or masses  MS: no gross musculoskeletal defects noted, no edema  SKIN: no suspicious lesions or rashes  NEURO: Normal strength and tone, mentation intact and speech normal  PSYCH: mentation appears normal, affect normal/bright    Diagnostic Test Results:  Labs pending     ASSESSMENT/PLAN:   1. Routine general medical examination at a health care facility    - Comprehensive metabolic panel  - CBC with platelets  - Lipid panel reflex to direct LDL Fasting    2. SVEN (obstructive sleep apnea)      3. Alopecia    - finasteride (PROPECIA) 1 MG tablet; Take 1 tablet (1 mg) by mouth daily  Dispense: 90 tablet; Refill: 3    4. Prostate cancer screening    - Prostate spec antigen screen    5. Colon cancer screening    - Fecal colorectal cancer screen FIT; Future    6. Male erectile disorder    - sildenafil (VIAGRA) 25 MG tablet; Take 1 tablet (25 mg) by mouth daily as needed (ED)  Dispense: 12 tablet; Refill: 11    7. H/O cold sores    - valACYclovir (VALTREX) 1000 mg tablet; Take 2 tablets (2,000 mg) by mouth 2 times daily  Dispense: 4 tablet; Refill: 1    Patient has been advised of split billing requirements and indicates understanding: Yes  COUNSELING:   Reviewed preventive health counseling, as reflected in patient instructions  Special attention given to:        Regular exercise       Healthy diet/nutrition       Immunizations    Vaccines are up to date            Consider Hep C screening for all patients one time for ages 18-79 years; will do        HIV screeninx in teen years, 1x in adult years, and at intervals if high risk; done       Colon cancer screening ;prefers FIT to colonoscopy during COVID       Prostate cancer screening;  PSA    Estimated body mass index is 29.97 kg/m  as calculated from the following:    Height as of this encounter: 1.829 m (6').    Weight as of this encounter: 100.2 kg (221 lb).     Weight management plan: Discussed healthy diet and exercise guidelines    He reports that he has never smoked. He has never used smokeless tobacco.      Counseling Resources:  ATP IV Guidelines  Pooled Cohorts Equation Calculator  FRAX Risk Assessment  ICSI Preventive Guidelines  Dietary Guidelines for Americans, 2010  USDA's MyPlate  ASA Prophylaxis  Lung CA Screening    Mars Bourgeois MD  [facility]

## 2020-12-18 NOTE — LETTER
December 21, 2020      Rajiv Feng  [address] [place] [address]        Dear YenniferJung,    The following letter pertains to your most recent diagnostic tests:     -The cholesterol has worsened considerably since last check.       -The liver test ALT is minimally elevated.  This is most likely from fat deposition in the liver from weight gain.       -Kidney function is normal for you (Creatinine, GFR), Sodium is normal, Potassium is normal for you, Calcium is normal for you, Glucose (blood sugar) is normal for you.       -Your prostate specific antigen (PSA) test result returned normal.     -Your complete blood counts including your hemoglobin returned normal for you.         Bottom line:  If you realize your goal of losing a few pounds over the next few months, your cholesterol and ALT blood test should return to previous levels.         Follow up:  I recommend a lab appointment in 3 months to recheck the cholesterol and liver test.   Between then and now, I would recommend working hard on the diet change plans you discussed with me during our visit.       Resulted Orders   Comprehensive metabolic panel   Result Value Ref Range    Sodium 138 133 - 144 mmol/L    Potassium 4.1 3.4 - 5.3 mmol/L    Chloride 107 94 - 109 mmol/L    Carbon Dioxide 28 20 - 32 mmol/L    Anion Gap 3 3 - 14 mmol/L    Glucose 85 70 - 99 mg/dL    Urea Nitrogen 10 7 - 30 mg/dL    Creatinine 0.96 0.66 - 1.25 mg/dL    GFR Estimate >90 >60 mL/min/[1.73_m2]      Comment:      Non  GFR Calc  Starting 12/18/2018, serum creatinine based estimated GFR (eGFR) will be   calculated using the Chronic Kidney Disease Epidemiology Collaboration   (CKD-EPI) equation.      GFR Estimate If Black >90 >60 mL/min/[1.73_m2]      Comment:       GFR Calc  Starting 12/18/2018, serum creatinine based estimated GFR (eGFR) will be   calculated using the Chronic Kidney Disease Epidemiology Collaboration   (CKD-EPI) equation.       Calcium 8.9 8.5 - 10.1 mg/dL    Bilirubin Total 0.4 0.2 - 1.3 mg/dL    Albumin 3.9 3.4 - 5.0 g/dL    Protein Total 7.3 6.8 - 8.8 g/dL    Alkaline Phosphatase 74 40 - 150 U/L    ALT 78 (H) 0 - 70 U/L    AST 28 0 - 45 U/L   CBC with platelets   Result Value Ref Range    WBC 8.5 4.0 - 11.0 10e9/L    RBC Count 5.22 4.4 - 5.9 10e12/L    Hemoglobin 15.2 13.3 - 17.7 g/dL    Hematocrit 45.8 40.0 - 53.0 %    MCV 88 78 - 100 fl    MCH 29.1 26.5 - 33.0 pg    MCHC 33.2 31.5 - 36.5 g/dL    RDW 13.2 10.0 - 15.0 %    Platelet Count 198 150 - 450 10e9/L   Lipid panel reflex to direct LDL Fasting   Result Value Ref Range    Cholesterol 206 (H) <200 mg/dL      Comment:      Desirable:       <200 mg/dl    Triglycerides 178 (H) <150 mg/dL      Comment:      Borderline high:  150-199 mg/dl  High:             200-499 mg/dl  Very high:       >499 mg/dl      HDL Cholesterol 35 (L) >39 mg/dL    LDL Cholesterol Calculated 135 (H) <100 mg/dL      Comment:      Above desirable:  100-129 mg/dl  Borderline High:  130-159 mg/dL  High:             160-189 mg/dL  Very high:       >189 mg/dl      Non HDL Cholesterol 171 (H) <130 mg/dL      Comment:      Above Desirable:  130-159 mg/dl  Borderline high:  160-189 mg/dl  High:             190-219 mg/dl  Very high:       >219 mg/dl     Prostate spec antigen screen   Result Value Ref Range    PSA 0.20 0 - 4 ug/L      Comment:      Assay Method:  Chemiluminescence using Siemens Vista analyzer       If you have any questions or concerns, please call the clinic at the number listed above.       Sincerely,      Mars Bourgeois MD  ernie

## 2020-12-19 LAB
ALBUMIN SERPL-MCNC: 3.9 G/DL (ref 3.4–5)
ALP SERPL-CCNC: 74 U/L (ref 40–150)
ALT SERPL W P-5'-P-CCNC: 78 U/L (ref 0–70)
ANION GAP SERPL CALCULATED.3IONS-SCNC: 3 MMOL/L (ref 3–14)
AST SERPL W P-5'-P-CCNC: 28 U/L (ref 0–45)
BILIRUB SERPL-MCNC: 0.4 MG/DL (ref 0.2–1.3)
BUN SERPL-MCNC: 10 MG/DL (ref 7–30)
CALCIUM SERPL-MCNC: 8.9 MG/DL (ref 8.5–10.1)
CHLORIDE SERPL-SCNC: 107 MMOL/L (ref 94–109)
CHOLEST SERPL-MCNC: 206 MG/DL
CO2 SERPL-SCNC: 28 MMOL/L (ref 20–32)
CREAT SERPL-MCNC: 0.96 MG/DL (ref 0.66–1.25)
GFR SERPL CREATININE-BSD FRML MDRD: >90 ML/MIN/{1.73_M2}
GLUCOSE SERPL-MCNC: 85 MG/DL (ref 70–99)
HDLC SERPL-MCNC: 35 MG/DL
LDLC SERPL CALC-MCNC: 135 MG/DL
NONHDLC SERPL-MCNC: 171 MG/DL
POTASSIUM SERPL-SCNC: 4.1 MMOL/L (ref 3.4–5.3)
PROT SERPL-MCNC: 7.3 G/DL (ref 6.8–8.8)
PSA SERPL-ACNC: 0.2 UG/L (ref 0–4)
SODIUM SERPL-SCNC: 138 MMOL/L (ref 133–144)
TRIGL SERPL-MCNC: 178 MG/DL

## 2020-12-20 NOTE — RESULT ENCOUNTER NOTE
The following letter pertains to your most recent diagnostic tests:    -The cholesterol has worsened considerably since last check.       -The liver test ALT is minimally elevated.  This is most likely from fat deposition in the liver from weight gain.      -Kidney function is normal for you (Creatinine, GFR), Sodium is normal, Potassium is normal for you, Calcium is normal for you, Glucose (blood sugar) is normal for you.      -Your prostate specific antigen (PSA) test result returned normal.     -Your complete blood counts including your hemoglobin returned normal for you.         Bottom line:  If you realize your goal of losing a few pounds over the next few months, your cholesterol and ALT blood test should return to previous levels.        Follow up:  I recommend a lab appointment in 3 months to recheck the cholesterol and liver test.   Between then and now, I would recommend working hard on the diet change plans you discussed with me during our visit.         Sincerely,    Dr. Bourgeois

## 2021-01-04 DIAGNOSIS — L65.9 ALOPECIA: ICD-10-CM

## 2021-01-05 RX ORDER — FINASTERIDE 1 MG/1
TABLET, FILM COATED ORAL
Qty: 30 TABLET | Refills: 1 | Status: SHIPPED | OUTPATIENT
Start: 2021-01-05 | End: 2021-01-21

## 2021-01-05 NOTE — TELEPHONE ENCOUNTER
Routing refill request to provider for review/approval because:  Failed protocol  Miscellaneous Dermatologic Agents [id number]/04/2021 09:30 AM   Refill request is not for Imiquimod, 5-Fluorouracil, or Finasteride

## 2021-01-21 DIAGNOSIS — L65.9 ALOPECIA: ICD-10-CM

## 2021-01-21 NOTE — TELEPHONE ENCOUNTER
Disp Refills Start End ARPITA   finasteride (PROPECIA) 1 MG tablet 30 tablet 1 1/5/2021  No   Sig: TAKE 1 TABLET BY MOUTH EVERY DAY     Med filled 1/05/21, this request is for a 90 day supply (not pended)

## 2021-01-23 RX ORDER — FINASTERIDE 1 MG/1
1 TABLET, FILM COATED ORAL DAILY
Qty: 30 TABLET | Refills: 1 | Status: SHIPPED | OUTPATIENT
Start: 2021-01-23 | End: 2022-02-07

## 2021-01-29 DIAGNOSIS — Z12.11 COLON CANCER SCREENING: ICD-10-CM

## 2021-01-29 LAB — HEMOCCULT STL QL IA: NEGATIVE

## 2021-01-29 PROCEDURE — 82274 ASSAY TEST FOR BLOOD FECAL: CPT | Performed by: INTERNAL MEDICINE

## 2021-01-29 NOTE — LETTER
February 1, 2021      Rajiv Feng  [address]  [place] [address]        Dear ,    The following letter pertains to your most recent diagnostic tests:     (We are including December results as it does not appear you viewed via MY CHART from our end)      -The cholesterol has worsened considerably since last check.        -The liver test ALT is minimally elevated.  This is most likely from fat deposition in the liver from weight gain.       -Kidney function is normal for you (Creatinine, GFR), Sodium is normal, Potassium is normal for you, Calcium is normal for you, Glucose (blood sugar) is normal for you.       -Your prostate specific antigen (PSA) test result returned normal.      -Your complete blood counts including your hemoglobin returned normal for you.         Your stool test for colon cancer screening is negative.  This should be repeated in one year.        Bottom line:  If you realize your goal of losing a few pounds over the next few months, your cholesterol and ALT blood test should return to previous levels.          Follow up:  I recommend a lab appointment in 3 months to recheck the cholesterol and liver test.   Between then and now, I would recommend working hard on the diet change plans you discussed with me during our visit.       Sincerely,     Dr. Bourgeois            Resulted Orders   Fecal colorectal cancer screen FIT   Result Value Ref Range     Occult Blood Scn FIT Negative NEG^Negative      Component      Latest Ref Rng & Units 12/18/2020   Sodium      133 - 144 mmol/L 138   Potassium      3.4 - 5.3 mmol/L 4.1   Chloride      94 - 109 mmol/L 107   Carbon Dioxide      20 - 32 mmol/L 28   Anion Gap      3 - 14 mmol/L 3   Glucose      70 - 99 mg/dL 85   Urea Nitrogen      7 - 30 mg/dL 10   Creatinine      0.66 - 1.25 mg/dL 0.96   GFR Estimate      >60 mL/min/1.73:m2 >90   GFR Estimate If Black      >60 mL/min/1.73:m2 >90   Calcium      8.5 - 10.1 mg/dL 8.9   Bilirubin Total       0.2 - 1.3 mg/dL 0.4   Albumin      3.4 - 5.0 g/dL 3.9   Protein Total      6.8 - 8.8 g/dL 7.3   Alkaline Phosphatase      40 - 150 U/L 74   ALT      0 - 70 U/L 78 (H)   AST      0 - 45 U/L 28   WBC      4.0 - 11.0 10e9/L 8.5   RBC Count      4.4 - 5.9 10e12/L 5.22   Hemoglobin      13.3 - 17.7 g/dL 15.2   Hematocrit      40.0 - 53.0 % 45.8   MCV      78 - 100 fl 88   MCH      26.5 - 33.0 pg 29.1   MCHC      31.5 - 36.5 g/dL 33.2   RDW      10.0 - 15.0 % 13.2   Platelet Count      150 - 450 10e9/L 198   Cholesterol      <200 mg/dL 206 (H)   Triglycerides      <150 mg/dL 178 (H)   HDL Cholesterol      >39 mg/dL 35 (L)   LDL Cholesterol Calculated      <100 mg/dL 135 (H)   Non HDL Cholesterol      <130 mg/dL 171 (H)   PSA      0 - 4 ug/L 0.20         (These results are viewable via MY CHART.   If you are having trouble accessing your account call  [phone])

## 2021-01-29 NOTE — LETTER
February 1, 2021      Rajiv Feng  [address]  [place] [address]        Dear ,  The following letter pertains to your most recent diagnostic tests:     The following letter pertains to your most recent diagnostic tests:     -The cholesterol has worsened considerably since last check.        -The liver test ALT is minimally elevated.  This is most likely from fat deposition in the liver from weight gain.       -Kidney function is normal for you (Creatinine, GFR), Sodium is normal, Potassium is normal for you, Calcium is normal for you, Glucose (blood sugar) is normal for you.       -Your prostate specific antigen (PSA) test result returned normal.      -Your complete blood counts including your hemoglobin returned normal for you.         Your stool test for colon cancer screening is negative.  This should be repeated in one year.        Bottom line:  If you realize your goal of losing a few pounds over the next few months, your cholesterol and ALT blood test should return to previous levels.          Follow up:  I recommend a lab appointment in 3 months to recheck the cholesterol and liver test.   Between then and now, I would recommend working hard on the diet change plans you discussed with me during our visit.       Sincerely,     Dr. oBurgeois     Resulted Orders   Fecal colorectal cancer screen FIT   Result Value Ref Range    Occult Blood Scn FIT Negative NEG^Negative     Component      Latest Ref Rng & Units 12/18/2020   Sodium      133 - 144 mmol/L 138   Potassium      3.4 - 5.3 mmol/L 4.1   Chloride      94 - 109 mmol/L 107   Carbon Dioxide      20 - 32 mmol/L 28   Anion Gap      3 - 14 mmol/L 3   Glucose      70 - 99 mg/dL 85   Urea Nitrogen      7 - 30 mg/dL 10   Creatinine      0.66 - 1.25 mg/dL 0.96   GFR Estimate      >60 mL/min/1.73:m2 >90   GFR Estimate If Black      >60 mL/min/1.73:m2 >90   Calcium      8.5 - 10.1 mg/dL 8.9   Bilirubin Total      0.2 - 1.3 mg/dL 0.4   Albumin      3.4 -  5.0 g/dL 3.9   Protein Total      6.8 - 8.8 g/dL 7.3   Alkaline Phosphatase      40 - 150 U/L 74   ALT      0 - 70 U/L 78 (H)   AST      0 - 45 U/L 28   WBC      4.0 - 11.0 10e9/L 8.5   RBC Count      4.4 - 5.9 10e12/L 5.22   Hemoglobin      13.3 - 17.7 g/dL 15.2   Hematocrit      40.0 - 53.0 % 45.8   MCV      78 - 100 fl 88   MCH      26.5 - 33.0 pg 29.1   MCHC      31.5 - 36.5 g/dL 33.2   RDW      10.0 - 15.0 % 13.2   Platelet Count      150 - 450 10e9/L 198   Cholesterol      <200 mg/dL 206 (H)   Triglycerides      <150 mg/dL 178 (H)   HDL Cholesterol      >39 mg/dL 35 (L)   LDL Cholesterol Calculated      <100 mg/dL 135 (H)   Non HDL Cholesterol      <130 mg/dL 171 (H)   PSA      0 - 4 ug/L 0.20       (These results are viewable via MY CHART.   If you are having trouble accessing your account call  [phone])

## 2021-03-24 ENCOUNTER — APPOINTMENT (OUTPATIENT)
Dept: LAB | Facility: CLINIC | Age: 51
End: 2021-03-24
Payer: COMMERCIAL

## 2021-07-06 ENCOUNTER — MYC MEDICAL ADVICE (OUTPATIENT)
Dept: FAMILY MEDICINE | Facility: CLINIC | Age: 51
End: 2021-07-06

## 2021-07-06 DIAGNOSIS — Z86.19 H/O COLD SORES: ICD-10-CM

## 2021-07-06 RX ORDER — VALACYCLOVIR HYDROCHLORIDE 1 G/1
2000 TABLET, FILM COATED ORAL 2 TIMES DAILY
Qty: 4 TABLET | Refills: 4 | Status: SHIPPED | OUTPATIENT
Start: 2021-07-06 | End: 2022-02-07

## 2021-07-06 NOTE — TELEPHONE ENCOUNTER
Routing refill request to provider for review/approval because:  Drug not on the [facility] refill protocol , patient requesting multiple refills.     Anne Marie Gomez RN  [facility] Triage

## 2021-10-09 ENCOUNTER — HEALTH MAINTENANCE LETTER (OUTPATIENT)
Age: 51
End: 2021-10-09

## 2022-01-29 ENCOUNTER — HEALTH MAINTENANCE LETTER (OUTPATIENT)
Age: 52
End: 2022-01-29

## 2022-02-03 ASSESSMENT — ENCOUNTER SYMPTOMS
PARESTHESIAS: 0
MYALGIAS: 0
HEADACHES: 0
SHORTNESS OF BREATH: 0
DIARRHEA: 0
PALPITATIONS: 0
JOINT SWELLING: 0
HEMATURIA: 0
FEVER: 0
HEMATOCHEZIA: 0
CONSTIPATION: 0
HEARTBURN: 0
DIZZINESS: 0
EYE PAIN: 0
WEAKNESS: 0
CHILLS: 0
NERVOUS/ANXIOUS: 0
COUGH: 0
DYSURIA: 0
ARTHRALGIAS: 0
NAUSEA: 0
SORE THROAT: 0
ABDOMINAL PAIN: 0
FREQUENCY: 0

## 2022-02-07 ENCOUNTER — OFFICE VISIT (OUTPATIENT)
Dept: FAMILY MEDICINE | Facility: CLINIC | Age: 52
End: 2022-02-07
Payer: COMMERCIAL

## 2022-02-07 VITALS
WEIGHT: 216 LBS | HEART RATE: 81 BPM | SYSTOLIC BLOOD PRESSURE: 120 MMHG | TEMPERATURE: 97.1 F | HEIGHT: 72 IN | RESPIRATION RATE: 18 BRPM | OXYGEN SATURATION: 97 % | DIASTOLIC BLOOD PRESSURE: 78 MMHG | BODY MASS INDEX: 29.26 KG/M2

## 2022-02-07 DIAGNOSIS — N52.9 MALE ERECTILE DISORDER: ICD-10-CM

## 2022-02-07 DIAGNOSIS — L65.9 ALOPECIA: ICD-10-CM

## 2022-02-07 DIAGNOSIS — Z86.19 H/O COLD SORES: ICD-10-CM

## 2022-02-07 DIAGNOSIS — Z00.00 ROUTINE HISTORY AND PHYSICAL EXAMINATION OF ADULT: Primary | ICD-10-CM

## 2022-02-07 LAB
ANION GAP SERPL CALCULATED.3IONS-SCNC: 1 MMOL/L (ref 3–14)
BASOPHILS # BLD AUTO: 0 10E3/UL (ref 0–0.2)
BASOPHILS NFR BLD AUTO: 0 %
BUN SERPL-MCNC: 14 MG/DL (ref 7–30)
CALCIUM SERPL-MCNC: 9 MG/DL (ref 8.5–10.1)
CHLORIDE BLD-SCNC: 108 MMOL/L (ref 94–109)
CHOLEST SERPL-MCNC: 192 MG/DL
CO2 SERPL-SCNC: 29 MMOL/L (ref 20–32)
CREAT SERPL-MCNC: 1.05 MG/DL (ref 0.66–1.25)
EOSINOPHIL # BLD AUTO: 0.2 10E3/UL (ref 0–0.7)
EOSINOPHIL NFR BLD AUTO: 3 %
ERYTHROCYTE [DISTWIDTH] IN BLOOD BY AUTOMATED COUNT: 13.6 % (ref 10–15)
FASTING STATUS PATIENT QL REPORTED: YES
GFR SERPL CREATININE-BSD FRML MDRD: 86 ML/MIN/1.73M2
GLUCOSE BLD-MCNC: 83 MG/DL (ref 70–99)
HBA1C MFR BLD: 4.9 % (ref 0–5.6)
HCT VFR BLD AUTO: 47.7 % (ref 40–53)
HDLC SERPL-MCNC: 43 MG/DL
HGB BLD-MCNC: 15.9 G/DL (ref 13.3–17.7)
LDLC SERPL CALC-MCNC: 131 MG/DL
LYMPHOCYTES # BLD AUTO: 2 10E3/UL (ref 0.8–5.3)
LYMPHOCYTES NFR BLD AUTO: 28 %
MCH RBC QN AUTO: 29.4 PG (ref 26.5–33)
MCHC RBC AUTO-ENTMCNC: 33.3 G/DL (ref 31.5–36.5)
MCV RBC AUTO: 88 FL (ref 78–100)
MONOCYTES # BLD AUTO: 0.6 10E3/UL (ref 0–1.3)
MONOCYTES NFR BLD AUTO: 8 %
NEUTROPHILS # BLD AUTO: 4.3 10E3/UL (ref 1.6–8.3)
NEUTROPHILS NFR BLD AUTO: 60 %
NONHDLC SERPL-MCNC: 149 MG/DL
PLATELET # BLD AUTO: 185 10E3/UL (ref 150–450)
POTASSIUM BLD-SCNC: 4.2 MMOL/L (ref 3.4–5.3)
PSA SERPL-MCNC: 0.24 UG/L (ref 0–4)
RBC # BLD AUTO: 5.41 10E6/UL (ref 4.4–5.9)
SODIUM SERPL-SCNC: 138 MMOL/L (ref 133–144)
TRIGL SERPL-MCNC: 88 MG/DL
WBC # BLD AUTO: 7 10E3/UL (ref 4–11)

## 2022-02-07 PROCEDURE — 80061 LIPID PANEL: CPT | Performed by: INTERNAL MEDICINE

## 2022-02-07 PROCEDURE — 85025 COMPLETE CBC W/AUTO DIFF WBC: CPT | Performed by: INTERNAL MEDICINE

## 2022-02-07 PROCEDURE — G0103 PSA SCREENING: HCPCS | Performed by: INTERNAL MEDICINE

## 2022-02-07 PROCEDURE — 99396 PREV VISIT EST AGE 40-64: CPT | Performed by: INTERNAL MEDICINE

## 2022-02-07 PROCEDURE — 83036 HEMOGLOBIN GLYCOSYLATED A1C: CPT | Performed by: INTERNAL MEDICINE

## 2022-02-07 PROCEDURE — 80048 BASIC METABOLIC PNL TOTAL CA: CPT | Performed by: INTERNAL MEDICINE

## 2022-02-07 PROCEDURE — 36415 COLL VENOUS BLD VENIPUNCTURE: CPT | Performed by: INTERNAL MEDICINE

## 2022-02-07 RX ORDER — VALACYCLOVIR HYDROCHLORIDE 1 G/1
1000 TABLET, FILM COATED ORAL 2 TIMES DAILY
Qty: 30 TABLET | Refills: 3 | Status: SHIPPED | OUTPATIENT
Start: 2022-02-07 | End: 2023-06-19

## 2022-02-07 RX ORDER — SILDENAFIL 25 MG/1
25 TABLET, FILM COATED ORAL DAILY PRN
Qty: 12 TABLET | Refills: 11 | Status: SHIPPED | OUTPATIENT
Start: 2022-02-07 | End: 2023-10-04

## 2022-02-07 RX ORDER — FINASTERIDE 1 MG/1
1 TABLET, FILM COATED ORAL DAILY
Qty: 30 TABLET | Refills: 1 | Status: SHIPPED | OUTPATIENT
Start: 2022-02-07 | End: 2022-04-11

## 2022-02-07 ASSESSMENT — MIFFLIN-ST. JEOR: SCORE: 1872.77

## 2022-02-07 ASSESSMENT — PAIN SCALES - GENERAL: PAINLEVEL: NO PAIN (0)

## 2022-02-07 NOTE — LETTER
February 14, 2022      Rajiv Feng  [address]  [place] [address]        Dear ,    We are writing to inform you of your test results.    Your labs are OK except for mild hyperlipidemia, advise diet, exercise. No change in meds.       Resulted Orders   Lipid panel reflex to direct LDL Fasting   Result Value Ref Range    Cholesterol 192 <200 mg/dL    Triglycerides 88 <150 mg/dL    Direct Measure HDL 43 >=40 mg/dL    LDL Cholesterol Calculated 131 (H) <=100 mg/dL    Non HDL Cholesterol 149 (H) <130 mg/dL    Patient Fasting > 8hrs? Yes     Narrative    Cholesterol  Desirable:  <200 mg/dL    Triglycerides  Normal:  Less than 150 mg/dL  Borderline High:  150-199 mg/dL  High:  200-499 mg/dL  Very High:  Greater than or equal to 500 mg/dL    Direct Measure HDL  Female:  Greater than or equal to 50 mg/dL   Male:  Greater than or equal to 40 mg/dL    LDL Cholesterol  Desirable:  <100mg/dL  Above Desirable:  100-129 mg/dL   Borderline High:  130-159 mg/dL   High:  160-189 mg/dL   Very High:  >= 190 mg/dL    Non HDL Cholesterol  Desirable:  130 mg/dL  Above Desirable:  130-159 mg/dL  Borderline High:  160-189 mg/dL  High:  190-219 mg/dL  Very High:  Greater than or equal to 220 mg/dL   Hemoglobin A1c   Result Value Ref Range    Hemoglobin A1C 4.9 0.0 - 5.6 %      Comment:      Normal <5.7%   Prediabetes 5.7-6.4%    Diabetes 6.5% or higher     Note: Adopted from ADA consensus guidelines.   PSA, screen   Result Value Ref Range    Prostate Specific Antigen Screen 0.24 0.00 - 4.00 ug/L   Basic metabolic panel  (Ca, Cl, CO2, Creat, Gluc, K, Na, BUN)   Result Value Ref Range    Sodium 138 133 - 144 mmol/L    Potassium 4.2 3.4 - 5.3 mmol/L    Chloride 108 94 - 109 mmol/L    Carbon Dioxide (CO2) 29 20 - 32 mmol/L    Anion Gap 1 (L) 3 - 14 mmol/L    Urea Nitrogen 14 7 - 30 mg/dL    Creatinine 1.05 0.66 - 1.25 mg/dL    Calcium 9.0 8.5 - 10.1 mg/dL    Glucose 83 70 - 99 mg/dL    GFR Estimate 86 >60 mL/min/1.73m2       Comment:      Effective December 21, 2021 eGFRcr in adults is calculated using the 2021 CKD-EPI creatinine equation which includes age and gender (Tyler et al., NEJ, DOI: 10.1056/QCRPry2533332)   CBC with platelets and differential   Result Value Ref Range    WBC Count 7.0 4.0 - 11.0 10e3/uL    RBC Count 5.41 4.40 - 5.90 10e6/uL    Hemoglobin 15.9 13.3 - 17.7 g/dL    Hematocrit 47.7 40.0 - 53.0 %    MCV 88 78 - 100 fL    MCH 29.4 26.5 - 33.0 pg    MCHC 33.3 31.5 - 36.5 g/dL    RDW 13.6 10.0 - 15.0 %    Platelet Count 185 150 - 450 10e3/uL    % Neutrophils 60 %    % Lymphocytes 28 %    % Monocytes 8 %    % Eosinophils 3 %    % Basophils 0 %    Absolute Neutrophils 4.3 1.6 - 8.3 10e3/uL    Absolute Lymphocytes 2.0 0.8 - 5.3 10e3/uL    Absolute Monocytes 0.6 0.0 - 1.3 10e3/uL    Absolute Eosinophils 0.2 0.0 - 0.7 10e3/uL    Absolute Basophils 0.0 0.0 - 0.2 10e3/uL       If you have any questions or concerns, please call the clinic at the number listed above.       Sincerely,      Esme Mir MD

## 2022-02-07 NOTE — PROGRESS NOTES
SUBJECTIVE:   CC: Rajiv Feng is an 51 year old male who presents for preventative health visit.       Patient has been advised of split billing requirements and indicates understanding: Yes  Healthy Habits:     Getting at least 3 servings of Calcium per day:  Yes    Bi-annual eye exam:  NO    Dental care twice a year:  Yes    Sleep apnea or symptoms of sleep apnea:  Sleep apnea    Diet:  Regular (no restrictions)    Frequency of exercise:  2-3 days/week    Duration of exercise:  15-30 minutes    Taking medications regularly:  Yes    Barriers to taking medications:  None    Medication side effects:  None    PHQ-2 Total Score: 0    Additional concerns today:  No    Ability to successfully perform activities of daily living: Yes, no assistance needed  Home safety:  none identified   Hearing impairment: none      Today's PHQ-2 Score:   PHQ-2 ( 1999 Pfizer) 2/3/2022   Q1: Little interest or pleasure in doing things 0   Q2: Feeling down, depressed or hopeless 0   PHQ-2 Score 0   PHQ-2 Total Score (12-17 Years)- Positive if 3 or more points; Administer PHQ-A if positive -   Q1: Little interest or pleasure in doing things Not at all   Q2: Feeling down, depressed or hopeless Not at all   PHQ-2 Score 0       Abuse: Current or Past(Physical, Sexual or Emotional)- No  Do you feel safe in your environment? Yes    Have you ever done Advance Care Planning? (For example, a Health Directive, POLST, or a discussion with a medical provider or your loved ones about your wishes): Yes, patient states has an Advance Care Planning document and will bring a copy to the clinic.    Social History     Tobacco Use     Smoking status: Never Smoker     Smokeless tobacco: Never Used   Substance Use Topics     Alcohol use: No     If you drink alcohol do you typically have >3 drinks per day or >7 drinks per week? No    Alcohol Use 2/3/2022   Prescreen: >3 drinks/day or >7 drinks/week? No   Prescreen: >3 drinks/day or >7 drinks/week? -     Last  PSA:   PSA   Date Value Ref Range Status   12/18/2020 0.20 0 - 4 ug/L Final     Comment:     Assay Method:  Chemiluminescence using Siemens Vista analyzer       Reviewed orders with patient. Reviewed health maintenance and updated orders accordingly - Yes  Labs reviewed in EPIC    Reviewed and updated as needed this visit by clinical staff                Reviewed and updated as needed this visit by Provider               Past Medical History:   Diagnosis Date     SVEN (obstructive sleep apnea) 7/7/2017        Review of Systems  CONSTITUTIONAL: NEGATIVE for fever, chills, change in weight  INTEGUMENTARY/SKIN: NEGATIVE for worrisome rashes, moles or lesions  EYES: NEGATIVE for vision changes or irritation  ENT: NEGATIVE for ear, mouth and throat problems  RESP: NEGATIVE for significant cough or SOB  CV: NEGATIVE for chest pain, palpitations or peripheral edema  GI: NEGATIVE for nausea, abdominal pain, heartburn, or change in bowel habits   male: negative for dysuria, hematuria, decreased urinary stream, erectile dysfunction, urethral discharge  MUSCULOSKELETAL: NEGATIVE for significant arthralgias or myalgia  NEURO: NEGATIVE for weakness, dizziness or paresthesias  PSYCHIATRIC: NEGATIVE for changes in mood or affect    OBJECTIVE:   /78 (BP Location: Right arm, Patient Position: Sitting, Cuff Size: Adult Large)   Pulse 81   Temp 97.1  F (36.2  C) (Temporal)   Resp 18   Ht 1.829 m (6')   Wt 98 kg (216 lb)   SpO2 97%   BMI 29.29 kg/m      Physical Exam  GENERAL: alert and no distress  EYES: Eyes grossly normal to inspection, PERRL and conjunctivae and sclerae normal  HENT: ear canals and TM's normal, nose and mouth without ulcers or lesions  NECK: no adenopathy, no asymmetry, masses, or scars and thyroid normal to palpation  RESP: lungs clear to auscultation - no rales, rhonchi or wheezes  CV: regular rate and rhythm  ABDOMEN: soft, nontender, no hepatosplenomegaly, no masses and bowel sounds normal  MS: no  gross musculoskeletal defects noted, no edema  SKIN: no suspicious lesions or rashes  NEURO: Normal strength and tone, mentation intact and speech normal  PSYCH: mentation appears normal, affect normal/bright    Diagnostic Test Results:  Labs reviewed in Epic    ASSESSMENT/PLAN:   Rajiv was seen today for physical.    Diagnoses and all orders for this visit:    Routine history and physical examination of adult  -     Lipid panel reflex to direct LDL Fasting; Future  -     Hemoglobin A1c; Future  -     PSA, screen; Future  -     CBC with platelets and differential; Future  -     Basic metabolic panel  (Ca, Cl, CO2, Creat, Gluc, K, Na, BUN); Future    Alopecia  -     finasteride (PROPECIA) 1 MG tablet; Take 1 tablet (1 mg) by mouth daily    Male erectile disorder  -     sildenafil (VIAGRA) 25 MG tablet; Take 1 tablet (25 mg) by mouth daily as needed (ED)    H/O cold sores  -     Fecal colorectal cancer screen (FIT); Future  -     valACYclovir (VALTREX) 1000 mg tablet; Take 1 tablet (1,000 mg) by mouth 2 times daily        Patient has been advised of split billing requirements and indicates understanding: Yes    COUNSELING:   Reviewed preventive health counseling, as reflected in patient instructions  Special attention given to:        Regular exercise       Healthy diet/nutrition    Estimated body mass index is 29.97 kg/m  as calculated from the following:    Height as of 12/18/20: 1.829 m (6').    Weight as of 12/18/20: 100.2 kg (221 lb).     Weight management plan: Discussed healthy diet and exercise guidelines    He reports that he has never smoked. He has never used smokeless tobacco.      Counseling Resources:  ATP IV Guidelines  Pooled Cohorts Equation Calculator  FRAX Risk Assessment  ICSI Preventive Guidelines  Dietary Guidelines for Americans, 2010  USDA's MyPlate  ASA Prophylaxis  Lung CA Screening    Esme Mir MD  [facility]

## 2022-02-11 ENCOUNTER — MYC MEDICAL ADVICE (OUTPATIENT)
Dept: FAMILY MEDICINE | Facility: CLINIC | Age: 52
End: 2022-02-11
Payer: COMMERCIAL

## 2022-02-11 DIAGNOSIS — Z12.11 COLON CANCER SCREENING: Primary | ICD-10-CM

## 2022-02-11 NOTE — TELEPHONE ENCOUNTER
I ordered a colonoscopy  If there is a new sign or symptom for which the patient feels an EGD is warranted, then we should schedule visit to discuss the new problem and determine whether that is the appropriate diagnostic test

## 2022-02-11 NOTE — TELEPHONE ENCOUNTER
Dr. Bourgeois, please read patients my chart message.     Anne Marie GomezRN  [facility]

## 2022-04-08 ENCOUNTER — MYC MEDICAL ADVICE (OUTPATIENT)
Dept: FAMILY MEDICINE | Facility: CLINIC | Age: 52
End: 2022-04-08
Payer: COMMERCIAL

## 2022-04-08 DIAGNOSIS — L65.9 ALOPECIA: ICD-10-CM

## 2022-04-11 RX ORDER — FINASTERIDE 1 MG/1
1 TABLET, FILM COATED ORAL DAILY
Qty: 30 TABLET | Refills: 1 | Status: SHIPPED | OUTPATIENT
Start: 2022-04-11 | End: 2022-06-07

## 2022-04-11 NOTE — TELEPHONE ENCOUNTER
Last OV 2/7/22    Routing refill request to provider for review/approval because:        Renay CHANG, Triage RN  [facility]

## 2022-06-03 DIAGNOSIS — L65.9 ALOPECIA: ICD-10-CM

## 2022-06-07 RX ORDER — FINASTERIDE 1 MG/1
TABLET, FILM COATED ORAL
Qty: 30 TABLET | Refills: 0 | Status: SHIPPED | OUTPATIENT
Start: 2022-06-07 | End: 2022-07-08

## 2022-06-07 NOTE — TELEPHONE ENCOUNTER
Routing refill request to provider for review/approval because:    Renay CHANG, Triage RN  [facility]

## 2022-07-06 DIAGNOSIS — L65.9 ALOPECIA: ICD-10-CM

## 2022-07-08 DIAGNOSIS — L65.9 ALOPECIA: ICD-10-CM

## 2022-07-08 RX ORDER — FINASTERIDE 1 MG/1
1 TABLET, FILM COATED ORAL EVERY MORNING
Qty: 90 TABLET | Refills: 3 | Status: SHIPPED | OUTPATIENT
Start: 2022-07-08 | End: 2023-08-29

## 2022-07-08 NOTE — TELEPHONE ENCOUNTER
Routing refill request to provider for review/approval because:   Refill request is not for Imiquimod, 5-Fluorouracil, or Finasteride  Dex Huerta RN  [facility] Triage Nurse

## 2022-07-12 RX ORDER — FINASTERIDE 1 MG/1
1 TABLET, FILM COATED ORAL EVERY MORNING
Qty: 90 TABLET | Refills: 3 | OUTPATIENT
Start: 2022-07-12

## 2022-09-17 ENCOUNTER — HEALTH MAINTENANCE LETTER (OUTPATIENT)
Age: 52
End: 2022-09-17

## 2023-01-25 ENCOUNTER — TELEPHONE (OUTPATIENT)
Dept: FAMILY MEDICINE | Facility: CLINIC | Age: 53
End: 2023-01-25
Payer: COMMERCIAL

## 2023-01-25 ENCOUNTER — NURSE TRIAGE (OUTPATIENT)
Dept: FAMILY MEDICINE | Facility: CLINIC | Age: 53
End: 2023-01-25
Payer: COMMERCIAL

## 2023-01-25 NOTE — TELEPHONE ENCOUNTER
Patient had elevated ALT 2 years ago. Writer scheduled patient for am appt on 01/26/23.    Appointments in Next Year    Jan 26, 2023  9:30 AM  Covid Treatment Visit with  VIRTUAL CARE PROVIDER  [facility] ([facility]  ) [phone]   Feb 13, 2023  4:30 PM  (Arrive by 4:15 PM)  Travel Clinic Visit with YOGESH Duggan CNP  [facility] ([facility] ) [phone]        Marium Teague, RN on 1/25/2023 at 2:19 PM      RN COVID TREATMENT VISIT  01/25/23    Rajiv JHONY Feng  52 year old  Current weight? 215    Has the patient been seen by a primary care provider at an [facility] or [facility] within the past two years? Yes.   Have you been in close proximity to/do you have a known exposure to a person with a confirmed case of influenza? No.     Date of positive COVID test (PCR or at home)?  01/25/23    Current COVID symptoms: cough    Date COVID symptoms began: 01/22/23    Do you have any of the following conditions that place you at risk of being very sick from COVID-19? chronic lung disease    Is patient eligible to continue? Yes, established patient, 12 years or older weighing at least 88.2 lbs, who has COVID symptoms that started in the past 5 days and is at risk for being very sick from COVID-19.       Have you received monoclonal antibodies or oral antiviral medications since testing positive to COVID-19? No    Are you currently hospitalized for COVID-19? No    Do you have a history of hepatitis? No    Are you currently pregnant or nursing? No    Do you have a clinically significant hypersensitivity to nirmatrelvir, ritonavir, or molnupiravir? No    Do you have any history of severe renal impairment (eGFR < 30mL/min)? No    Do you have any history of hepatic impairment or abnormalities (e.g. hepatic panel, ALT, AST, ALK Phos, bilirubin)? No    Have you had a coronary stent placed in  the previous 6 months? No    Is patient eligible to continue? No, patient does not meet all eligibility requirements for the RN COVID treatment (as denoted by yes response(s) above). Patient informed they will need a virtual provider visit to assess treatment options.  Patient will be transferred to a  at the end of this call.   Marium Teague RN                Reason for Disposition    [1] COVID-19 diagnosed by positive lab test (e.g., PCR, rapid self-test kit) AND [2] NO symptoms (e.g., cough, fever, others)    Additional Information    Negative: SEVERE difficulty breathing (e.g., struggling for each breath, speaks in single words)    Negative: Difficult to awaken or acting confused (e.g., disoriented, slurred speech)    Negative: Bluish (or gray) lips or face now    Negative: Shock suspected (e.g., cold/pale/clammy skin, too weak to stand, low BP, rapid pulse)    Negative: Sounds like a life-threatening emergency to the triager    Negative: [1] Diagnosed or suspected COVID-19 AND [2] symptoms lasting 3 or more weeks    Negative: [1] COVID-19 exposure AND [2] no symptoms    Negative: COVID-19 vaccine reaction suspected (e.g., fever, headache, muscle aches) occurring 1 to 3 days after getting vaccine    Negative: COVID-19 vaccine, questions about    Negative: [1] Lives with someone known to have influenza (flu test positive) AND [2] flu-like symptoms (e.g., cough, runny nose, sore throat, SOB; with or without fever)    Negative: [1] Adult with possible COVID-19 symptoms AND [2] triager concerned about severity of symptoms or other causes    Negative: COVID-19 and breastfeeding, questions about    Negative: SEVERE or constant chest pain or pressure  (Exception: Mild central chest pain, present only when coughing.)    Negative: MODERATE difficulty breathing (e.g., speaks in phrases, SOB even at rest, pulse 100-120)    Negative: Headache and stiff neck (can't touch chin to chest)    Negative: Oxygen level  "(e.g., pulse oximetry) 90 percent or lower    Negative: Chest pain or pressure  (Exception: MILD central chest pain, present only when coughing)    Negative: Patient sounds very sick or weak to the triager    Negative: MILD difficulty breathing (e.g., minimal/no SOB at rest, SOB with walking, pulse <100)    Negative: Fever > 103 F (39.4 C)    Negative: [1] Fever > 101 F (38.3 C) AND [2] over 60 years of age    Negative: [1] Fever > 100.0 F (37.8 C) AND [2] bedridden (e.g., nursing home patient, CVA, chronic illness, recovering from surgery)    Negative: [1] HIGH RISK for severe COVID complications (e.g., weak immune system, age > 64 years, obesity with BMI of 30 or higher, pregnant, chronic lung disease or other chronic medical condition) AND [2] COVID symptoms (e.g., cough, fever)  (Exceptions: Already seen by PCP and no new or worsening symptoms.)    Negative: [1] HIGH RISK patient AND [2] influenza is widespread in the community AND [3] ONE OR MORE respiratory symptoms: cough, sore throat, runny or stuffy nose    Negative: [1] HIGH RISK patient AND [2] influenza exposure within the last 7 days AND [3] ONE OR MORE respiratory symptoms: cough, sore throat, runny or stuffy nose    Negative: Oxygen level (e.g., pulse oximetry) 91 to 94 percent    Negative: [1] COVID-19 infection suspected by caller or triager AND [2] mild symptoms (cough, fever, or others) AND [3] negative COVID-19 rapid test    Negative: Fever present > 3 days (72 hours)    Negative: [1] Fever returns after gone for over 24 hours AND [2] symptoms worse or not improved    Negative: [1] Continuous (nonstop) coughing interferes with work or school AND [2] no improvement using cough treatment per Care Advice    Negative: Cough present > 3 weeks    Answer Assessment - Initial Assessment Questions  1. COVID-19 DIAGNOSIS: \"Who made your COVID-19 diagnosis?\" \"Was it confirmed by a positive lab test or self-test?\" If not diagnosed by a doctor (or NP/PA), ask " "\"Are there lots of cases (community spread) where you live?\" Note: See public health department website, if unsure.      Home test, [facility] PCR was 01/25/23  2. COVID-19 EXPOSURE: \"Was there any known exposure to COVID before the symptoms began?\" CDC Definition of close contact: within 6 feet (2 meters) for a total of 15 minutes or more over a 24-hour period.       Two guys at work that also have covid right now.   3. ONSET: \"When did the COVID-19 symptoms start?\"       Sunday  4. WORST SYMPTOM: \"What is your worst symptom?\" (e.g., cough, fever, shortness of breath, muscle aches)      Coughing   5. COUGH: \"Do you have a cough?\" If Yes, ask: \"How bad is the cough?\"        Yes  6. FEVER: \"Do you have a fever?\" If Yes, ask: \"What is your temperature, how was it measured, and when did it start?\"      100.0  7. RESPIRATORY STATUS: \"Describe your breathing?\" (e.g., shortness of breath, wheezing, unable to speak)       Shortness of breath when laying down or trying to sleep  8. BETTER-SAME-WORSE: \"Are you getting better, staying the same or getting worse compared to yesterday?\"  If getting worse, ask, \"In what way?\"      Staying the same  9. HIGH RISK DISEASE: \"Do you have any chronic medical problems?\" (e.g., asthma, heart or lung disease, weak immune system, obesity, etc.)      Apnea  10. VACCINE: \"Have you had the COVID-19 vaccine?\" If Yes, ask: \"Which one, how many shots, when did you get it?\"        I got five of them total  11. BOOSTER: \"Have you received your COVID-19 booster?\" If Yes, ask: \"Which one and when did you get it?\"        Last booster on 10/14/23-bivalant-moderna  12. PREGNANCY: \"Is there any chance you are pregnant?\" \"When was your last menstrual period?\"        N/A  13. OTHER SYMPTOMS: \"Do you have any other symptoms?\"  (e.g., chills, fatigue, headache, loss of smell or taste, muscle pain, sore throat)        Chills, fatigue, headache, sore throat  14. O2 SATURATION MONITOR:  \"Do you use an oxygen " "saturation monitor (pulse oximeter) at home?\" If Yes, ask \"What is your reading (oxygen level) today?\" \"What is your usual oxygen saturation reading?\" (e.g., 95%)        96 % on room air    Protocols used: CORONAVIRUS (COVID-19) DIAGNOSED OR [id number]      "

## 2023-01-25 NOTE — TELEPHONE ENCOUNTER
COVID Positive/Requesting COVID treatment    Patient is positive for COVID and requesting treatment options.    Date of positive COVID test (PCR or at home)? 01.23.23 PCR  Current COVID symptoms: fever or chills, cough, fatigue, headache, sore throat and congestion or runny nose  Date COVID symptoms began: 01.22.23    Message should be routed to clinic RN pool. Best phone number to use for call back: [phone]

## 2023-01-26 ENCOUNTER — VIRTUAL VISIT (OUTPATIENT)
Dept: URGENT CARE | Facility: CLINIC | Age: 53
End: 2023-01-26
Payer: COMMERCIAL

## 2023-01-26 DIAGNOSIS — U07.1 INFECTION DUE TO 2019 NOVEL CORONAVIRUS: Primary | ICD-10-CM

## 2023-01-26 DIAGNOSIS — N52.9 ERECTILE DYSFUNCTION, UNSPECIFIED ERECTILE DYSFUNCTION TYPE: ICD-10-CM

## 2023-01-26 DIAGNOSIS — G47.33 OSA (OBSTRUCTIVE SLEEP APNEA): ICD-10-CM

## 2023-01-26 PROCEDURE — 99213 OFFICE O/P EST LOW 20 MIN: CPT | Mod: CS

## 2023-01-26 NOTE — PROGRESS NOTES
Assessment & Plan     Infection due to 2019 novel coronavirus  - nirmatrelvir and ritonavir (PAXLOVID) therapy pack; Take 3 tablets by mouth 2 times daily for 5 days    SVEN (obstructive sleep apnea)  Stable. Return to wearing CPAP when you are able    Erectile dysfunction, unspecified erectile dysfunction type  Stable. Hold Viagra while on Paxlovid, may restart Feb 4    Return in about 1 week (around 2/2/2023) for visit with primary care provider if not improving.     COVID-19 positive patient.  Encounter for consideration of medication intervention. Patient does qualify for a prescription. Full discussion with patient including medication options, risks and benefits. Potential drug interactions reviewed with patient.     Treatment Planned: Paxlovid  Temporary change to home medications: hold Viagra, may restart 4 days after finishing Paxlovid    Estimated body mass index is 29.29 kg/m  as calculated from the following:    Height as of 2/7/22: 1.829 m (6').    Weight as of 2/7/22: 98 kg (216 lb).  GFR Estimate   Date Value Ref Range Status   02/07/2022 86 >60 mL/min/1.73m2 Final     Comment:     Effective December 21, 2021 eGFRcr in adults is calculated using the 2021 CKD-EPI creatinine equation which includes age and gender (Tyler et al., NEJ, DOI: 10.1056/AQOBzj7831801)   12/18/2020 >90 >60 mL/min/[1.73_m2] Final     Comment:     Non  GFR Calc  Starting 12/18/2018, serum creatinine based estimated GFR (eGFR) will be   calculated using the Chronic Kidney Disease Epidemiology Collaboration   (CKD-EPI) equation.       No results found for: [id number]    [facility]    Subjective   Rajiv Feng is a 52 year old who presents for the following health issues    HPI    Rajiv presents via video after testing positive for COVID.  Symptoms first began with chills 4 days ago.  He has had a temperature up to 101 and a cough that is nonproductive but feels like  it is coming from mucus in his chest.  He normally wears a CPAP at night but has not been able to wear this because the pressure causes discomfort in his chest.  His oxygen saturation has been in the low 90s and his pulse has been up to 140 with minimal exertion.    Review of Systems   ROS negative except as stated above.      Objective    Physical Exam   GENERAL: Healthy, alert and no distress  EYES: Eyes grossly normal to inspection.  No discharge or erythema, or obvious scleral/conjunctival abnormalities.  HENT: Normal cephalic/atraumatic.  External ears, nose and mouth without ulcers or lesions.  No nasal drainage visible.  RESP: Intermittent cough, no audible wheeze or visible cyanosis.  No visible retractions or increased work of breathing.    SKIN: Visible skin clear. No significant rash, abnormal pigmentation or lesions.  NEURO: Cranial nerves grossly intact.  Mentation and speech appropriate for age.  PSYCH: Mentation appears normal, affect normal/bright, judgement and insight intact, normal speech and appearance well-groomed.    Type of service:  Video Visit  Video Start Time: 9:30AM  Video End Time: 9:38AM  Originating Location (pt. Location): Home  Distant Location (provider location):  [facility]- offsite at [facility]  Platform used for Video Visit: ErnestinaWell

## 2023-01-26 NOTE — PATIENT INSTRUCTIONS
Symptoms began January 22  Stay home and away from others through January 27  You may be around others wearing a well fitting mask on January 28- February 1  Your isolation restrictions are over on February 2 as long as your symptoms are improving and you have been fever free for 24 hours, even if you still test positive for COVID.  However, if you test negative twice, at least 48 hours apart between days 6-10, you can stop wearing your mask earlier    Visit the CDC websites for more information and most up to date guidelines:  www.cdc.gov/coronavirus/2019-ncov/your-health/isolation.html  www.cdc.gov/coronavirus/2019-ncov/hcp/duration-isolation.html    Hold Viagra, you may restart February 3    Check O2 levels. If your number stays at 90 or below at rest, go to the emergency room.

## 2023-02-13 ENCOUNTER — OFFICE VISIT (OUTPATIENT)
Dept: FAMILY MEDICINE | Facility: CLINIC | Age: 53
End: 2023-02-13
Payer: COMMERCIAL

## 2023-02-13 VITALS
TEMPERATURE: 97.8 F | HEIGHT: 72 IN | SYSTOLIC BLOOD PRESSURE: 116 MMHG | BODY MASS INDEX: 29.32 KG/M2 | HEART RATE: 81 BPM | WEIGHT: 216.5 LBS | DIASTOLIC BLOOD PRESSURE: 72 MMHG | RESPIRATION RATE: 12 BRPM | OXYGEN SATURATION: 97 %

## 2023-02-13 DIAGNOSIS — Z71.84 TRAVEL ADVICE ENCOUNTER: Primary | ICD-10-CM

## 2023-02-13 PROCEDURE — 99402 PREV MED CNSL INDIV APPRX 30: CPT | Mod: GA | Performed by: NURSE PRACTITIONER

## 2023-02-13 RX ORDER — AZITHROMYCIN 500 MG/1
500 TABLET, FILM COATED ORAL DAILY
Qty: 3 TABLET | Refills: 0 | Status: SHIPPED | OUTPATIENT
Start: 2023-02-13 | End: 2023-02-16

## 2023-02-13 ASSESSMENT — PAIN SCALES - GENERAL: PAINLEVEL: NO PAIN (0)

## 2023-02-13 NOTE — PROGRESS NOTES
"Nurse Note ( Pre-Travel Consult)      Itinerary:  [facility]      Departure Date: 2/23      Return Date: 3/11      Length of Trip 2.5 weeks      Reason for Travel: Tourism           Urban or rural: urban      Accommodations: Hotel    Private dwelling        IMMUNIZATION HISTORY  Have you received any immunizations within the past 4 weeks?  No  Have you ever fainted from having your blood drawn or from an injection?  No  Have you ever had a fever reaction to vaccination?  No  Have you ever had any bad reaction or side effect from any vaccination?  No  Have you ever had hepatitis A or B vaccine?  Yes  Do you live (or work closely) with anyone who has AIDS, an AIDS-like condition, any other immune disorder or who is on chemotherapy for cancer?  No  Do you have a family history of immunodeficiency?  No  Have you received any injection of immune globulin or any blood products during the past 12 months?  No    Patient roomed by Staff    Elly Feng is a 52 year old male  seen today with spouse for counsultation for international travel.   Patient will be departing in  10 day(s) and  traveling with spouse.      Patient itinerary :  will be in the urban region of  [facility]     Patient's activities will include sightseeing and visiting.    Patient's country of birth is [place]    Special medical concerns: Sleep apnea   Pre-travel questionnaire was completed by patient and reviewed by provider.     Vitals: /72 (BP Location: Left arm, Patient Position: Sitting, Cuff Size: Adult Large)   Pulse 81   Temp 97.8  F (36.6  C) (Temporal)   Resp 12   Ht 1.82 m (5' 11.65\")   Wt 98.2 kg (216 lb 8 oz)   SpO2 97%   BMI 29.65 kg/m    BMI= Body mass index is 29.65 kg/m .    EXAM:  General:  Well-nourished, well-developed in no acute distress.  Appears to be stated age, interacts appropriately and expresses understanding of information given to patient.    Current Outpatient Medications   Medication Sig " Dispense Refill     finasteride (PROPECIA) 1 MG tablet Take 1 tablet (1 mg) by mouth every morning 90 tablet 3     sildenafil (VIAGRA) 25 MG tablet Take 1 tablet (25 mg) by mouth daily as needed (ED) 12 tablet 11     valACYclovir (VALTREX) 1000 mg tablet Take 1 tablet (1,000 mg) by mouth 2 times daily 30 tablet 3     Patient Active Problem List   Diagnosis     SVEN (obstructive sleep apnea)     Allergies   Allergen Reactions     Broccoli [Brassica Oleracea Italica] Anaphylaxis     Penicillin G Anaphylaxis     Pt's brother is allergic and was told not to take         Immunizations discussed include:   Covid 19: Up to date  Hepatitis A:  Up to date  Hepatitis B: Up to date  Influenza: Up to date  Typhoid: Declined  Not concerned about risk of disease  Rabies: Declined  reviewed managment of a animal bite or scratch (washing wound, seek medical care within 24 hours for post exposure prophylaxis )  Yellow Fever: Not indicated  Japanese Encephalitis: Not indicated - risk of disease is minimal all urban  Meningococcus: Not indicated  Tetanus/Diphtheria: Up to date  Measles/Mumps/Rubella: Up to date  Cholera: Not needed  Polio: Up to date  Pneumococcal: Under age of 65  Varicella: Immune by disease history per patient report  Shingrix: Up to date  HPV:  Not indicated     TB: NA    Altitude Exposure on this trip: no  Past tolerance to Altitude: na    ASSESSMENT/PLAN:  Rajiv was seen today for travel clinic.    Diagnoses and all orders for this visit:    Travel advice encounter  -     azithromycin (ZITHROMAX) 500 MG tablet; Take 1 tablet (500 mg) by mouth daily for 3 doses Take 1 tablet a day for up to 3 days for severe diarrhea      I have reviewed general recommendations for safe travel   including: food/water precautions, insect precautions, roadway safety. Educational materials and Travax report provided.    Malaraia prophylaxis recommended: NA  Symptomatic treatment for traveler's diarrhea: azithromycin      Evacuation  insurance advised and resources were provided to patient.    Total visit time 30 minutes  with over 50% of time spent counseling patient and shared decision making as detailed above.    Renay Lunsford CNP  Certificate in Travel Health

## 2023-02-13 NOTE — LETTER
February 13, 2023      Rajiv BOOKER PatriciaGenome  [address]  [place] [address]          Document of Recovery from Covid-19 viral infection.     To whom it may concern,    The traveler named above was recently evaluated at the [facility]. A test for SARS-CoV-2 (Covid 19) was performed on 01/23/23  and was positive.  Enclosed is a copy of that positive result. The patient now meets all national criteria for discontinuation of 10 days of isolation.  Patient is cleared for international travel.    The Center of Disease Control ( CDC) states that retesting an asymptomatic individual who has previously tested positive for SARS-CoV-2 RNA (COVID 19) within the past 3 months is not advised due to continued shedding of detectable (but NOT infectious) SARS - CoV-2 RNA resulting in persistent positive tests.     Due to past resolved infection, this traveler is cleared for international travel and further quarantine is not recommended.    Thank you          Renay Lunsford (Lori) CNP                 Dear Rajiv,          Sincerely,      Renay Lunsford HIRAL

## 2023-02-13 NOTE — PATIENT INSTRUCTIONS
Thank you for visiting the [facility] : [phone]  Today February 13, 2023 you received the    None today    Follow up vaccine appointments can be made as a NURSE ONLY visit at the Travel Clinic, (BE PREPARED TO WAIT, ) or at designated [facility].    If you are receiving the Rabies vaccines series, it is important that you follow the exact schedule ordered.     Pre-travel     We recommend that you purchase Medical Evacuation Insurance prior to your departure.  Https://wwwnc.cdc.gov/travel/page/insurance    [place] your travel plans with the [employer] Department of [place] through STEP ( Smart Traveler Enrollment Program ) https://step.state.gov.  STEP is a free service to allow U[place] citizens and nationals traveling and living abroad to enroll their trip with the nearest U.S. Embassy or Consulate.    Animal Exposure: Avoid all mammals even if they look healthy.  If there is a bite, scratch or even a lick, wash area immediately with soap and water for 15 minutes and seek medical care within 24 hours for evaluation of Rabies post exposure treatment.  Contact your Medical Evacuation Insurance.    COVID 19 (Sars Cov2) prevention strategies  Physical distancing: Maintain 6 foot (2m) from others.              Avoid large gatherings and public transportation.   Avoid indoor shopping malls, theaters and restaurants   Practice consistent mask wearing covering the nose, mouth and underneath the chin when unable to maintain 6 foot distance from others.  Hand washing: frequent, thorough handwashing with soap and water for 20 seconds (or using a hand  containing 60% alcohol)   Avoid touching face, nose, eyes, mouth unless you have done appropriate hand washing as above.   Clean high touch surfaces with approved disinfectant against Covid 19  (70% Ethanol ) or a bleach solution (add 20 mL (4 teaspoons) of bleach to 1 L (1 quart) of water;)  Be careful not to breath or touch  bleach.      Travel Covid 19 Testing:  updated 12/06/2021  International travelers: Pre-travel: diagnostic testing (antigen or PCR) may be required for entry:  See country specific Embassy websites or airline websites.    Post travel: CDC recommends getting tested 3-5 days after your trip     COVID-19 testing scheduling number for pre-travel through [facility]  [phone] (Must have an order). Available 24 hours a day.  You can also schedule through My Chart.     Post-travel illness:  Contact your provider or [facility] if you develop a fever, rash, cough, diarrhea or other symptoms for up to 1 year after travel.  Inform your healthcare provider when and where you traveled to.    Please call the [facility] with any questions [phone]  Or send your provider a 'My Chart' note.

## 2023-03-23 ENCOUNTER — MYC MEDICAL ADVICE (OUTPATIENT)
Dept: FAMILY MEDICINE | Facility: CLINIC | Age: 53
End: 2023-03-23
Payer: COMMERCIAL

## 2023-03-23 DIAGNOSIS — R13.10 DYSPHAGIA, UNSPECIFIED TYPE: ICD-10-CM

## 2023-03-23 DIAGNOSIS — Z12.11 SCREEN FOR COLON CANCER: Primary | ICD-10-CM

## 2023-03-24 NOTE — TELEPHONE ENCOUNTER
Please review MyChart message from patient regarding colonoscopy. If appropriate, will need new referral for colonoscopy as previous referral has . Patient also requesting upper GI endoscopy as well.    Cameron Vivar CMA on 3/24/2023 at 9:10 AM

## 2023-04-03 ENCOUNTER — TELEPHONE (OUTPATIENT)
Dept: GASTROENTEROLOGY | Facility: CLINIC | Age: 53
End: 2023-04-03
Payer: COMMERCIAL

## 2023-04-03 NOTE — TELEPHONE ENCOUNTER
Screening Questions  BLUE  KIND OF PREP RED  LOCATION [review exclusion criteria] GREEN  SEDATION TYPE        Y Are you active on mychart?       JERED Ordering/Referring Provider?        MEDICA What type of coverage do you have?      N Have you had a positive covid test in the last 14 days?     29.2 1. BMI  [BMI 40+ - review exclusion criteria]    Y  2. Are you able to give consent for your medical care? [IF NO,RN REVIEW]          N  3. Are you taking any prescription pain medications on a routine schedule   (ex narcotics: oxycodone, roxicodone, oxycontin,  and percocet)? [RN Review]        N  3a. EXTENDED PREP What kind of prescription?     N 4. Do you have any chemical dependencies such as alcohol, street drugs, or methadone?        **If yes 3- 5 , please schedule with MAC sedation.**          IF YES TO ANY 6 - 10 - HOSPITAL SETTING ONLY.     N 6.   Do you need assistance transferring?     N 7.   Have you had a heart or lung transplant?    N 8.   Are you currently on dialysis?   N 9.   Do you use daily home oxygen?   N 10. Do you take nitroglycerin?   10a. N If yes, how often?     11. [FEMALES]   Are you currently pregnant?    11a.  If yes, how many weeks? [ Greater than 12 weeks, OR NEEDED]    N 12. Do you have Pulmonary Hypertension? *NEED PAC APPT AT [facility] w/ MAC*     N 13. [review exclusion criteria]  Do you have any implantable devices in your body (pacemaker, defib, LVAD)?    N 14. In the past 6 months, have you had any heart related issues including cardiomyopathy or heart attack?     14a. N If yes, did it require cardiac stenting if so when?     N 15. Have you had a stroke or Transient ischemic attack (TIA - aka  mini stroke ) within 6 months?      Y 16. Do you have mod to severe Obstructive Sleep Apnea?  [Hospital only]    N 17. Do you have SEVERE AND UNCONTROLLED asthma? *NEED PAC APPT AT [facility] w/MAC*     18. Are you currently taking any blood thinners?     18a. No. Continue to 19.   18b.      19. Do  "you take the medication Phentermine?    19a. If yes, \"Hold for 7 days before procedure.  Please consult your prescribing provider if you have questions about holding this medication.\"     N  20. Do you have chronic kidney disease?      N  21. Do you have a diagnosis of diabetes?     N  22. On a regular basis do you go 3-5 days between bowel movements?      23. Preferred LOCAL Pharmacy for Pre Prescription    [ LIST ONLY ONE PHARMACY]          [id number] - [place], [place] - [address]        - CLOSING REMINDERS -    Informed patient they will need an adult    Cannot take any type of public or medical transportation alone    Conscious Sedation- Needs  for 6 hours after the procedure       MAC/General-Needs  for 24 hours after procedure    Pre-Procedure Covid test to be completed [[facility] PCR Testing Required]    Confirmed Nurse will call to complete assessment       - SCHEDULING DETAILS -  Y Hospital Setting Required? If yes, what is the exclusion?: SVEN   LONG  Surgeon    5/19  Date of Procedure  Upper and Lower Endoscopy [EGD and Colonoscopy]  [facility]   MIRALAX GATORADE WITHOUT MAGNEISUM CITRATE Which Colonoscopy Prep was Sent?     CS Sedation Type     N PAC / Pre-op Required                 "

## 2023-04-04 ENCOUNTER — TELEPHONE (OUTPATIENT)
Dept: GASTROENTEROLOGY | Facility: CLINIC | Age: 53
End: 2023-04-04
Payer: COMMERCIAL

## 2023-04-04 NOTE — TELEPHONE ENCOUNTER
Caller: Rajiv  Reason for Reschedule/Cancellation (please be detailed, any staff messages or encounters to note?): Schedule conflict      Prior to reschedule please review:    Ordering Provider:CRISSY LAWTON    Sedation per order: Moderate    Does patient have any ASC Exclusions, please identify?: Y - SVEN      Notes on Cancelled Procedure:    Procedure:Upper and Lower Endoscopy [EGD and Colonoscopy]     Date: 5/19/2023    Location:[facility]; [address] [place] [address]    Surgeon: Long        Rescheduled: Yes    Procedure: Upper and Lower Endoscopy [EGD and Colonoscopy]     Date: 5/26/2023    Location:[facility]; [address] [place] [address]    Surgeon: Long    Sedation Level Scheduled  Moderate,  Reason for Sedation Level Order    Prep/Instructions updated and sent: Bakari

## 2023-05-06 ENCOUNTER — HEALTH MAINTENANCE LETTER (OUTPATIENT)
Age: 53
End: 2023-05-06

## 2023-05-10 ENCOUNTER — TELEPHONE (OUTPATIENT)
Dept: GASTROENTEROLOGY | Facility: CLINIC | Age: 53
End: 2023-05-10
Payer: COMMERCIAL

## 2023-05-10 NOTE — TELEPHONE ENCOUNTER
Attempted to contact patient regarding upcoming Colonoscopy/Upper endoscopy (EGD) procedure on 5.26.2023 for pre assessment questions. No answer.     Left message to return call to [phone] #4    Discuss Covid policy and designated  policy.    Pre op exam? N/A    Arrival time: 1245. Procedure time: 1330    Facility location: [facility]; [address] [place] [address]    Sedation type: Conscious sedation     NSAIDs? No NSAID medications per patient's medication list.  RN will verify with pre-assessment call.    Anticoagulants: No    Electronic implanted devices? No    Diabetic? No    Indication for procedure: screening colonoscopy, dysphagia    Bowel prep recommendation: Miralax prep without magnesium citrate     Prep instructions sent via Interview Master.       Kate Castillo RN  Endoscopy Procedure Pre Assessment RN

## 2023-05-10 NOTE — TELEPHONE ENCOUNTER
Pre assessment questions completed for upcoming Colonoscopy/Upper endoscopy (EGD) procedure scheduled on 5/26/23    COVID policy reviewed.     Pre-op exam? N/A    Reviewed procedural arrival time 1245, procedure time 1330 and facility location [facility]; [address] [place] [address]    Designated  policy reviewed. Instructed to have someone stay 6 hours post procedure.     NSAIDs? No    Anticoagulation/blood thinners? No    Electronic implanted devices? No    Diabetic? No    Reviewed procedure prep instructions.     Patient verbalized understanding and had no questions or concerns at this time.    Nallely Carpio, RN  Endoscopy Procedure Pre Assessment RN

## 2023-05-26 ENCOUNTER — HOSPITAL ENCOUNTER (OUTPATIENT)
Facility: CLINIC | Age: 53
Discharge: HOME | End: 2023-05-26
Attending: INTERNAL MEDICINE | Admitting: INTERNAL MEDICINE
Payer: COMMERCIAL

## 2023-05-26 VITALS
WEIGHT: 216 LBS | DIASTOLIC BLOOD PRESSURE: 85 MMHG | RESPIRATION RATE: 23 BRPM | OXYGEN SATURATION: 96 % | BODY MASS INDEX: 29.26 KG/M2 | SYSTOLIC BLOOD PRESSURE: 121 MMHG | HEART RATE: 101 BPM | HEIGHT: 72 IN

## 2023-05-26 LAB
COLONOSCOPY: NORMAL
UPPER GI ENDOSCOPY: NORMAL

## 2023-05-26 PROCEDURE — 43239 EGD BIOPSY SINGLE/MULTIPLE: CPT | Performed by: INTERNAL MEDICINE

## 2023-05-26 PROCEDURE — 250N000009 HC RX 250: Performed by: INTERNAL MEDICINE

## 2023-05-26 PROCEDURE — 88305 TISSUE EXAM BY PATHOLOGIST: CPT | Mod: 26 | Performed by: PATHOLOGY

## 2023-05-26 PROCEDURE — 99153 MOD SED SAME PHYS/QHP EA: CPT | Performed by: INTERNAL MEDICINE

## 2023-05-26 PROCEDURE — 250N000011 HC RX IP 250 OP 636: Performed by: INTERNAL MEDICINE

## 2023-05-26 PROCEDURE — G0500 MOD SEDAT ENDO SERVICE >5YRS: HCPCS | Performed by: INTERNAL MEDICINE

## 2023-05-26 PROCEDURE — 45385 COLONOSCOPY W/LESION REMOVAL: CPT | Performed by: INTERNAL MEDICINE

## 2023-05-26 PROCEDURE — 88305 TISSUE EXAM BY PATHOLOGIST: CPT | Mod: TC | Performed by: INTERNAL MEDICINE

## 2023-05-26 RX ORDER — FENTANYL CITRATE 50 UG/ML
INJECTION, SOLUTION INTRAMUSCULAR; INTRAVENOUS PRN
Status: DISCONTINUED | OUTPATIENT
Start: 2023-05-26 | End: 2023-05-26 | Stop reason: HOSPADM

## 2023-05-26 ASSESSMENT — ACTIVITIES OF DAILY LIVING (ADL): ADLS_ACUITY_SCORE: 35

## 2023-05-26 NOTE — H&P
[facility] Anesthesia Pre-op History and Physical    Rajiv Feng MRN# [id number]   Age: 52 year old YOB: 1970      Date of Surgery: 5/26/2023 Location [facility]      Date of Exam 5/26/2023 Facility (Same day)       Home clinic: [facility]  Primary care provider: Mars Bourgeois         Chief Complaint and/or Reason for Procedure:   No chief complaint on file.  EGD. dysphagia  Colonoscopy. screen       Active problem list:     Patient Active Problem List    Diagnosis Date Noted     SVEN (obstructive sleep apnea) 07/07/2017     Priority: Medium            Medications (include herbals and vitamins):   Any Plavix use in the last 7 days? No     No current facility-administered medications for this encounter.     Current Outpatient Medications   Medication Sig     finasteride (PROPECIA) 1 MG tablet Take 1 tablet (1 mg) by mouth every morning     sildenafil (VIAGRA) 25 MG tablet Take 1 tablet (25 mg) by mouth daily as needed (ED)     valACYclovir (VALTREX) 1000 mg tablet Take 1 tablet (1,000 mg) by mouth 2 times daily             Allergies:      Allergies   Allergen Reactions     Broccoli [Brassica Oleracea] Anaphylaxis     Penicillin G Anaphylaxis     Pt's brother is allergic and was told not to take     Allergy to Latex? No  Allergy to tape?   No  Intolerances:             Physical Exam:   All vitals have been reviewed  No data found.  No intake/output data recorded.  Lungs:   No increased work of breathing, good air exchange, clear to auscultation bilaterally, no crackles or wheezing     Cardiovascular:   Normal apical impulse, regular rate and rhythm, normal S1 and S2, no S3 or S4, and no murmur noted             Lab / Radiology Results:            Anesthetic risk and/or ASA classification:       Ravi Chinchilla MD

## 2023-05-26 NOTE — LETTER
May 31, 2023      Rajiv Feng  [address]  [place] [address]        Dear ,    We are writing to inform you of your test results.    The esophageal biopsies confirm a swallowing condition (eosinophilic esophagitis) which can be treated with the Prilosec.  There are other treatments as well depending upon your symptoms.  The colon polyp is non-concerning. A repeat exam in 10 yrs is suggested.    Resulted Orders   Surgical Pathology Exam   Result Value Ref Range    Case Report       Surgical Pathology Report                         Case: [id number]                                  Authorizing Provider:  Ravi Chinchilla MD        Collected:           05/26/2023 01:58 PM          Ordering Location:     [facility]          Received:            05/30/2023 06:24 AM                                 [facility]                                                          Pathologist:           Helga Sow MD PhD                                                      Specimens:   A) - Rectum, x1                                                                                     B) - Esophagus                                                                             Final Diagnosis       A(1). Colon, Rectum, polyp, polypectomy:  -Hyperplastic polyp  -Negative for dysplasia or malignancy.      B(2). Esophagus, biopsy:  -Squamous mucosa with prominent intraepithelial eosinophils and prominent reactive epithelial changes.  -Approximately 25 eosinophils per high-power field identified in maximal focus.  -Negative for intestinal metaplasia.  -Negative for dysplasia or malignancy          Comment       Prominent intra-epithelial eosinophilia (approximately 25 eosinophils per high-power field) is identified.  In the appropriate clinical and endoscopic setting, findings are compatible with eosinophilic esophagitis.      Clinical Information       Procedure:  Colonoscopy  Esophagoscopy, gastroscopy,  "duodenoscopy (EGD), combined  Pre-op Diagnosis: Screen for colon cancer [Z12.11]  Dysphagia, unspecified type [R13.10]  Post-op Diagnosis: Z12.11 - Screen for colon cancer [ICD-10-CM]  R13.10 - Dysphagia, unspecified type [ICD-10-CM]        Gross Description       A(1). Rectum, x1:  The specimen is received in formalin, labeled with the patient's name, medical record number and other identifying information designated \"rectal polyp x1\". It consists of a single 1.0 cm gray-tan polypoid tissue fragment.  Inked black, sectioned, entirely submitted in 1 cassette.    B(2). Esophagus, :  The specimen is received in formalin, labeled with the patient's name, medical record number and other identifying information designated \"esophageal biopsy\". It consists of multiple pale-tan soft tissue fragments, less than 0.1-0.3 cm.  Entirely submitted in 1 cassette.  (Bridgette Blank Biopsy Tech)      Microscopic Description       Microscopic examination was performed.      Performing Labs       The technical component of this testing was completed at  [facility]        Case Images         If you have any questions or concerns, please call the clinic at the number listed above.       Sincerely,      Ravi Chinchilla MD            "

## 2023-05-31 LAB
PATH REPORT.COMMENTS IMP SPEC: NORMAL
PATH REPORT.FINAL DX SPEC: NORMAL
PATH REPORT.GROSS SPEC: NORMAL
PATH REPORT.MICROSCOPIC SPEC OTHER STN: NORMAL
PATH REPORT.RELEVANT HX SPEC: NORMAL
PHOTO IMAGE: NORMAL

## 2023-06-19 ENCOUNTER — VIRTUAL VISIT (OUTPATIENT)
Dept: FAMILY MEDICINE | Facility: CLINIC | Age: 53
End: 2023-06-19
Payer: COMMERCIAL

## 2023-06-19 DIAGNOSIS — K20.0 EOSINOPHILIC ESOPHAGITIS: Primary | ICD-10-CM

## 2023-06-19 DIAGNOSIS — L65.9 ALOPECIA: ICD-10-CM

## 2023-06-19 DIAGNOSIS — Z86.19 H/O COLD SORES: ICD-10-CM

## 2023-06-19 PROCEDURE — 99214 OFFICE O/P EST MOD 30 MIN: CPT | Mod: VID | Performed by: INTERNAL MEDICINE

## 2023-06-19 RX ORDER — VALACYCLOVIR HYDROCHLORIDE 1 G/1
1000 TABLET, FILM COATED ORAL 2 TIMES DAILY
Qty: 30 TABLET | Refills: 3 | Status: SHIPPED | OUTPATIENT
Start: 2023-06-19

## 2023-06-19 NOTE — PROGRESS NOTES
Rajiv is a 53 year old who is being evaluated via a billable video visit.      How would you like to obtain your AVS? MyChart  If the video visit is dropped, the invitation should be resent by: Text to cell phone: [phone]  Will anyone else be joining your video visit? No        Assessment & Plan     Eosinophilic esophagitis  Benefits of PPI therapy outweigh risks in setting of documented eosinophilic esophagitis   - omeprazole (PRILOSEC) 20 MG DR capsule; Take 1 capsule (20 mg) by mouth daily    Alopecia  Sexual problems may be a side effects of propecia  Trial off that drug to see if that addresses symptoms  If so, then see Dr. Lemons to discuss alternative treatments for alopecia  If not, then consider checking AM T levels and replacing if indicated   Discussed with patient and wife   - Adult Dermatology Referral; Future    H/O cold sores  Refill valtrex   - valACYclovir (VALTREX) 1000 mg tablet; Take 1 tablet (1,000 mg) by mouth 2 times daily      33 minutes spent by me on the date of the encounter doing chart review, history and exam, documentation and further activities per the note      Mars Bourgeois MD  [facility]    Elly Vance is a 53 year old, presenting for the following health issues:  Follow Up (Pt would like to discuss colonoscopy/endoscopy results)      History of Present Illness       Reason for visit:  Speak about colonoscopy and endoscopy    He eats 2-3 servings of fruits and vegetables daily.He consumes 0 sweetened beverage(s) daily.He exercises with enough effort to increase his heart rate 20 to 29 minutes per day.  He exercises with enough effort to increase his heart rate 3 or less days per week.   He is taking medications regularly.         Follow up colonoscopy EGD, sexual problem    EGD for dysphagia diagnosed eosinophilic esophagitis , PPI was recommended, he had questions about this  Also decreased libido for several years  He has spontaneous  erections  Viagra helps erections, but wife concerned that he does not want to use it that often  Takes propecia for alopecia       Review of Systems         Objective           Vitals:  No vitals were obtained today due to virtual visit.    Physical Exam   GENERAL: Healthy, alert and no distress  EYES: Eyes grossly normal to inspection.  No discharge or erythema, or obvious scleral/conjunctival abnormalities.  RESP: No audible wheeze, cough, or visible cyanosis.  No visible retractions or increased work of breathing.    SKIN: Visible skin clear. No significant rash, abnormal pigmentation or lesions.  NEURO: Cranial nerves grossly intact.  Mentation and speech appropriate for age.  PSYCH: Mentation appears normal, affect normal/bright, judgement and insight intact, normal speech and appearance well-groomed.                Video-Visit Details    Type of service:  Video Visit     Originating Location (pt. Location): Home  Distant Location (provider location):  On-site  Platform used for Video Visit: [employer]

## 2023-08-28 ENCOUNTER — OFFICE VISIT (OUTPATIENT)
Dept: FAMILY MEDICINE | Facility: CLINIC | Age: 53
End: 2023-08-28
Payer: COMMERCIAL

## 2023-08-28 VITALS
RESPIRATION RATE: 16 BRPM | HEIGHT: 72 IN | HEART RATE: 78 BPM | DIASTOLIC BLOOD PRESSURE: 79 MMHG | TEMPERATURE: 99.1 F | WEIGHT: 222 LBS | OXYGEN SATURATION: 95 % | SYSTOLIC BLOOD PRESSURE: 118 MMHG | BODY MASS INDEX: 30.07 KG/M2

## 2023-08-28 DIAGNOSIS — L65.9 ALOPECIA: ICD-10-CM

## 2023-08-28 DIAGNOSIS — Z71.84 TRAVEL ADVICE ENCOUNTER: Primary | ICD-10-CM

## 2023-08-28 PROCEDURE — 99401 PREV MED CNSL INDIV APPRX 15: CPT | Mod: GA | Performed by: NURSE PRACTITIONER

## 2023-08-28 NOTE — PATIENT INSTRUCTIONS
Thank you for visiting the [facility] : [phone]  Today August 28, 2023 you received the    Typhoid - Oral. This prescription has been faxed to your pharmacy, please take as directed.     Follow up vaccine appointments can be made as a NURSE ONLY visit at the Travel Clinic, (BE PREPARED TO WAIT, ) or at designated [facility].    If you are receiving the Rabies vaccines series, it is important that you follow the exact schedule ordered.     Pre-travel     We recommend that you purchase Medical Evacuation Insurance prior to your departure.  Https://wwwnc.cdc.gov/travel/page/insurance    [place] your travel plans with the  Department of [place] through STEP ( Smart Traveler Enrollment Program ) https://step.state.gov.  STEP is a free service to allow U[place] citizens and nationals traveling and living abroad to enroll their trip with the nearest U.S. Embassy or Consulate.    Animal Exposure: Avoid all mammals even if they look healthy.  If there is a bite, scratch or even a lick, wash area immediately with soap and water for 15 minutes and seek medical care within 24 hours for evaluation of Rabies post exposure treatment.  Contact your Medical Evacuation Insurance.    Repiratory illness prevention strategies ( Covid and Influenza ) CDC recommendations:  Consider wearing a mask in crowded or poorly ventilated indoor areas, including on public transportation and in transportation hubs.  Hand washing: frequent, thorough handwashing with soap and water for 20 seconds. Use an alcohol-based hand  with at least 60% alcohol if soap and water are not readily available. Avoid touching face, nose, eyes, mouth unless you have done appropriate hand washing as above.  VACCINES: Staying up to date on COVID-19 vaccines significantly lowers the risk of getting very sick, being hospitalized, or dying from COVID-19. CDC recommends that everyone stay up to date on their COVID-19  vaccines, especially people with weakened immune systems.    Travel Covid 19 Testing:  updated 12/06/2021  International travelers: Pre-travel:  See country specific Embassy websites or airline websites.    Post travel: CDC recommends getting tested 3-5 days after your trip     Post-travel illness:  Contact your provider or [facility] if you develop a fever, rash, cough, diarrhea or other symptoms for up to 1 year after travel.  Inform your healthcare provider when and where you traveled to.    Please call the [facility] with any questions [phone]  Or send your provider a 'My Chart' note.

## 2023-08-28 NOTE — PROGRESS NOTES
Nurse Note ( Pre-Travel Consult)    Itinerary:  [place] and [facility]    Departure Date: 10/11/2023    Return Date: 10/30/2023    Length of Trip 19 days     Reason for Travel: Tourism         Urban or rural: urban    Accommodations: Hotel  Airbnb and friends         IMMUNIZATION HISTORY  Have you received any immunizations within the past 4 weeks?  No  Have you ever fainted from having your blood drawn or from an injection?  No  Have you ever had a fever reaction to vaccination?  No  Have you ever had any bad reaction or side effect from any vaccination?  No  Have you ever had hepatitis A or B vaccine?  Yes  Do you live (or work closely) with anyone who has AIDS, an AIDS-like condition, any other immune disorder or who is on chemotherapy for cancer?  No  Do you have a family history of immunodeficiency?  No  Have you received any injection of immune globulin or any blood products during the past 12 months?  No    Patient roomed by MITCH Barajas is a 53 year old male seen today with spouse for counsultation for international travel.   Patient will be departing in  6 week(s) and  traveling with spouse.      Patient itinerary :  will be in the urban region of [place] and [place] which risk for food borne illnesses. exposure.      Patient's activities will include sightseeing.    Patient's country of birth is [place]    Special medical concerns: none  Pre-travel questionnaire was completed by patient and reviewed by provider.     Vitals: /79 (BP Location: Right arm, Patient Position: Sitting, Cuff Size: Adult Regular)   Pulse 78   Temp 99.1  F (37.3  C) (Temporal)   Resp 16   Ht 1.829 m (6')   Wt 100.7 kg (222 lb)   SpO2 95%   BMI 30.11 kg/m    BMI= Body mass index is 30.11 kg/m .    EXAM:  General:  Well-nourished, well-developed in no acute distress.  Appears to be stated age, interacts appropriately and expresses understanding of information given to patient.    Current  Outpatient Medications   Medication Sig Dispense Refill    omeprazole (PRILOSEC) 20 MG DR capsule Take 1 capsule (20 mg) by mouth daily 90 capsule 3    sildenafil (VIAGRA) 25 MG tablet Take 1 tablet (25 mg) by mouth daily as needed (ED) 12 tablet 11    typhoid (VIVOTIF) CR capsule Take 1 capsule by mouth every other day 4 capsule 0    valACYclovir (VALTREX) 1000 mg tablet Take 1 tablet (1,000 mg) by mouth 2 times daily 30 tablet 3    finasteride (PROPECIA) 1 MG tablet TAKE ONE TABLET BY MOUTH EVERY MORNING 90 tablet 0     Patient Active Problem List   Diagnosis    SVEN (obstructive sleep apnea)    Eosinophilic esophagitis    Alopecia    H/O cold sores     Allergies   Allergen Reactions    Broccoli [Brassica Oleracea] Anaphylaxis    Penicillin G Anaphylaxis     Pt's brother is allergic and was told not to take         Immunizations discussed include:   Covid 19: Up to date  Hepatitis A:  Up to date  Hepatitis B: Up to date  Influenza: vaccine is not available  Typhoid: Oral Typhoid (Vivotiff) Rx sent to pharmacy  Rabies: Declined  reviewed managment of a animal bite or scratch (washing wound, seek medical care within 24 hours for post exposure prophylaxis )  Yellow Fever: Not indicated  [place] Encephalitis: Not indicated - risk of disease is minimal urban  Meningococcus: Not indicated  Tetanus/Diphtheria: Up to date  Measles/Mumps/Rubella: Up to date  Cholera: Not needed  Polio: Not indicated  Pneumococcal: Under age of 65  Varicella: Immune by disease history per patient report  Shingrix: Up to date  HPV:  Not indicated     TB: low risk     Altitude Exposure on this trip: no  Past tolerance to Altitude: na    ASSESSMENT/PLAN:  Rajiv was seen today for travel clinic.    Diagnoses and all orders for this visit:    Travel advice encounter  -     typhoid (VIVOTIF) CR capsule; Take 1 capsule by mouth every other day      I have reviewed general recommendations for safe travel   including: food/water precautions, insect  precautions, roadway safety. Educational materials and [employer] report provided.    [place]ia prophylaxis recommended: none  Symptomatic treatment for traveler's diarrhea: loperamide/        Evacuation insurance advised and resources were provided to patient.    Total visit time 20 minutes  with over 50% of time spent counseling patient and shared decision making as detailed above.    Renay Lunsford CNP  Certificate in Travel Health

## 2023-08-29 RX ORDER — FINASTERIDE 1 MG/1
1 TABLET, FILM COATED ORAL EVERY MORNING
Qty: 90 TABLET | Refills: 0 | Status: SHIPPED | OUTPATIENT
Start: 2023-08-29 | End: 2023-09-29

## 2023-09-29 ENCOUNTER — MYC REFILL (OUTPATIENT)
Dept: FAMILY MEDICINE | Facility: CLINIC | Age: 53
End: 2023-09-29
Payer: COMMERCIAL

## 2023-09-29 DIAGNOSIS — L65.9 ALOPECIA: ICD-10-CM

## 2023-10-02 RX ORDER — FINASTERIDE 1 MG/1
1 TABLET, FILM COATED ORAL EVERY MORNING
Qty: 90 TABLET | Refills: 0 | Status: SHIPPED | OUTPATIENT
Start: 2023-10-02 | End: 2023-11-21

## 2023-10-04 DIAGNOSIS — N52.9 MALE ERECTILE DISORDER: ICD-10-CM

## 2023-10-04 RX ORDER — SILDENAFIL 25 MG/1
25 TABLET, FILM COATED ORAL DAILY PRN
Qty: 12 TABLET | Refills: 0 | Status: SHIPPED | OUTPATIENT
Start: 2023-10-04

## 2023-11-14 ASSESSMENT — ENCOUNTER SYMPTOMS
CHILLS: 0
CONSTIPATION: 0
HEMATURIA: 0
NAUSEA: 0
DIARRHEA: 0
EYE PAIN: 0
MYALGIAS: 0
NERVOUS/ANXIOUS: 0
PARESTHESIAS: 0
SHORTNESS OF BREATH: 0
WEAKNESS: 0
DYSURIA: 0
JOINT SWELLING: 0
COUGH: 0
DIZZINESS: 0
ABDOMINAL PAIN: 0
FREQUENCY: 0
SORE THROAT: 0
ARTHRALGIAS: 0
PALPITATIONS: 0
HEMATOCHEZIA: 0
FEVER: 0
HEADACHES: 0
HEARTBURN: 0

## 2023-11-17 ASSESSMENT — ENCOUNTER SYMPTOMS
CHILLS: 0
FEVER: 0
JOINT SWELLING: 0
COUGH: 0
FREQUENCY: 0
SHORTNESS OF BREATH: 0
DIARRHEA: 0
MYALGIAS: 0
SORE THROAT: 0
ABDOMINAL PAIN: 0
EYE PAIN: 0
HEMATOCHEZIA: 0
HEADACHES: 0
WEAKNESS: 0
PARESTHESIAS: 0
HEMATURIA: 0
PALPITATIONS: 0
NAUSEA: 0
DYSURIA: 0
HEARTBURN: 0
ARTHRALGIAS: 0
CONSTIPATION: 0
NERVOUS/ANXIOUS: 0
DIZZINESS: 0

## 2023-11-17 NOTE — PROGRESS NOTES
SUBJECTIVE:   Rajiv is a 53 year old, presenting for the following:  Physical        Healthy Habits:     Getting at least 3 servings of Calcium per day:  Yes    Bi-annual eye exam:  NO    Dental care twice a year:  Yes    Sleep apnea or symptoms of sleep apnea:  Sleep apnea    Diet:  Regular (no restrictions)    Frequency of exercise:  1 day/week    Duration of exercise:  15-30 minutes    Taking medications regularly:  Yes    Medication side effects:  None    Additional concerns today:  No        Social History     Tobacco Use    Smoking status: Never    Smokeless tobacco: Never   Substance Use Topics    Alcohol use: No             11/14/2023     9:31 AM   Alcohol Use   Prescreen: >3 drinks/day or >7 drinks/week? Not Applicable       Last PSA:   PSA   Date Value Ref Range Status   12/18/2020 0.20 0 - 4 ug/L Final     Comment:     Assay Method:  Chemiluminescence using Siemens Vista analyzer     Prostate Specific Antigen Screen   Date Value Ref Range Status   02/07/2022 0.24 0.00 - 4.00 ug/L Final       Reviewed orders with patient. Reviewed health maintenance and updated orders accordingly - Yes  BP Readings from Last 3 Encounters:   11/21/23 130/84   08/28/23 118/79   05/26/23 121/85    Wt Readings from Last 3 Encounters:   11/21/23 98 kg (216 lb)   08/28/23 100.7 kg (222 lb)   05/26/23 98 kg (216 lb)                  Patient Active Problem List   Diagnosis    SVEN (obstructive sleep apnea)    Eosinophilic esophagitis    Alopecia    H/O cold sores     Past Surgical History:   Procedure Laterality Date    COLONOSCOPY N/A 5/26/2023    Procedure: Colonoscopy;  Surgeon: Ravi Chinchilla MD;  Location:  GI    ESOPHAGOSCOPY, GASTROSCOPY, DUODENOSCOPY (EGD), COMBINED N/A 5/26/2023    Procedure: Esophagoscopy, gastroscopy, duodenoscopy (EGD), combined;  Surgeon: Ravi Chinchilla MD;  Location:  GI    EYE SURGERY      prk    NO HISTORY OF SURGERY      wisdom teeth         Social History     Tobacco Use    Smoking status:  Never    Smokeless tobacco: Never   Substance Use Topics    Alcohol use: No     Family History   Problem Relation Age of Onset    Vascular Disease Mother         smoker     Thyroid Disease Mother     Coronary Artery Disease Mother     Lung Cancer Father     Other Cancer Father         He smoked    Sleep Apnea Brother     Sleep Apnea Brother          Current Outpatient Medications   Medication Sig Dispense Refill    finasteride (PROPECIA) 1 MG tablet Take 1 tablet (1 mg) by mouth every morning 90 tablet 0    omeprazole (PRILOSEC) 20 MG DR capsule Take 1 capsule (20 mg) by mouth daily 90 capsule 3    sildenafil (VIAGRA) 25 MG tablet Take 1 tablet (25 mg) by mouth daily as needed 30 tablet 11    sildenafil (VIAGRA) 25 MG tablet TAKE ONE TABLET BY MOUTH DAILY AS NEEDED 12 tablet 0    valACYclovir (VALTREX) 1000 mg tablet Take 1 tablet (1,000 mg) by mouth 2 times daily 30 tablet 3       Reviewed and updated as needed this visit by clinical staff   Tobacco  Allergies  Meds              Reviewed and updated as needed this visit by Provider                 Past Medical History:   Diagnosis Date    SVEN (obstructive sleep apnea) 7/7/2017      Past Surgical History:   Procedure Laterality Date    COLONOSCOPY N/A 5/26/2023    Procedure: Colonoscopy;  Surgeon: Ravi Chinchilla MD;  Location:  GI    ESOPHAGOSCOPY, GASTROSCOPY, DUODENOSCOPY (EGD), COMBINED N/A 5/26/2023    Procedure: Esophagoscopy, gastroscopy, duodenoscopy (EGD), combined;  Surgeon: Ravi Chinchilla MD;  Location:  GI    EYE SURGERY      prk    NO HISTORY OF SURGERY      wisdom teeth         Review of Systems   Constitutional:  Negative for chills and fever.   HENT:  Negative for congestion, ear pain, hearing loss and sore throat.    Eyes:  Negative for pain and visual disturbance.   Respiratory:  Negative for cough and shortness of breath.    Cardiovascular:  Negative for chest pain, palpitations and peripheral edema.   Gastrointestinal:  Negative for  abdominal pain, constipation, diarrhea, heartburn, hematochezia and nausea.   Genitourinary:  Positive for impotence. Negative for dysuria, frequency, genital sores, hematuria, penile discharge and urgency.   Musculoskeletal:  Negative for arthralgias, joint swelling and myalgias.   Skin:  Negative for rash.   Neurological:  Negative for dizziness, weakness, headaches and paresthesias.   Psychiatric/Behavioral:  Negative for mood changes. The patient is not nervous/anxious.          OBJECTIVE:   /84 (BP Location: Left arm, Patient Position: Sitting, Cuff Size: Adult Large)   Pulse 87   Temp 98  F (36.7  C) (Oral)   Resp 18   Ht 1.829 m (6')   Wt 98 kg (216 lb)   SpO2 98%   BMI 29.29 kg/m      Physical Exam  GENERAL: healthy, alert and no distress  EYES: Eyes grossly normal to inspection, PERRL and conjunctivae and sclerae normal  HENT: ear canals and TM's normal, nose and mouth without ulcers or lesions  NECK: no adenopathy, no asymmetry, masses, or scars and thyroid normal to palpation  RESP: lungs clear to auscultation - no rales, rhonchi or wheezes  CV: regular rate and rhythm, normal S1 S2, no S3 or S4, no murmur, click or rub, no peripheral edema and peripheral pulses strong  ABDOMEN: soft, nontender, no hepatosplenomegaly, no masses and bowel sounds normal  RECTAL: normal sphincter tone, no rectal masses, prostate normal size, smooth, nontender without nodules or masses  MS: no gross musculoskeletal defects noted, no edema  SKIN: no suspicious lesions or rashes  NEURO: Normal strength and tone, mentation intact and speech normal  PSYCH: mentation appears normal, affect normal/bright        ASSESSMENT/PLAN:       ICD-10-CM    1. Routine general medical examination at a health care facility  Z00.00 Lipid panel reflex to direct LDL Fasting     Comprehensive metabolic panel     CBC with platelets      2. Eosinophilic esophagitis  K20.0       3. Alopecia  L65.9       4. SVEN (obstructive sleep apnea)   G47.33       5. Need for hepatitis C screening test  Z11.59 Hepatitis C Screen Reflex to HCV RNA Quant and Genotype      6. Screening for prostate cancer  Z12.5 PROSTATE SPEC ANTIGEN SCREEN      7. Male erectile disorder  N52.9 sildenafil (VIAGRA) 25 MG tablet      -on PPI for eosinophilic esophagitis with improved dysphagia   -on finasteride for hair loss  -blood pressure at upper limit of desired range discussed increasing physical activity and diet changes to address   -discussed side effects and risks of viagra; he would like to try         COUNSELING:   Reviewed preventive health counseling, as reflected in patient instructions  Special attention given to:        Regular exercise       Healthy diet/nutrition       Immunizations  Vaccines are up to date            Colorectal cancer screening; repeat 5/2033       Prostate cancer screening; PSA       BMI:   Estimated body mass index is 29.29 kg/m  as calculated from the following:    Height as of this encounter: 1.829 m (6').    Weight as of this encounter: 98 kg (216 lb).   Weight management plan: Discussed healthy diet and exercise guidelines      He reports that he has never smoked. He has never used smokeless tobacco.          Mars Bourgeois MD  [facility]    The 10-year ASCVD risk score (Bhargavi KAUFFMAN, et al., 2019) is: 5.2%    Values used to calculate the score:      Age: 53 years      Sex: Male      Is Non- : No      Diabetic: No      Tobacco smoker: No      Systolic Blood Pressure: 130 mmHg      Is BP treated: No      HDL Cholesterol: 43 mg/dL      Total Cholesterol: 192 mg/dL

## 2023-11-21 ENCOUNTER — OFFICE VISIT (OUTPATIENT)
Dept: FAMILY MEDICINE | Facility: CLINIC | Age: 53
End: 2023-11-21
Payer: COMMERCIAL

## 2023-11-21 VITALS
BODY MASS INDEX: 29.26 KG/M2 | RESPIRATION RATE: 18 BRPM | HEIGHT: 72 IN | WEIGHT: 216 LBS | OXYGEN SATURATION: 98 % | TEMPERATURE: 98 F | SYSTOLIC BLOOD PRESSURE: 130 MMHG | HEART RATE: 87 BPM | DIASTOLIC BLOOD PRESSURE: 84 MMHG

## 2023-11-21 DIAGNOSIS — N52.9 MALE ERECTILE DISORDER: ICD-10-CM

## 2023-11-21 DIAGNOSIS — G47.33 OSA (OBSTRUCTIVE SLEEP APNEA): ICD-10-CM

## 2023-11-21 DIAGNOSIS — Z12.5 SCREENING FOR PROSTATE CANCER: ICD-10-CM

## 2023-11-21 DIAGNOSIS — K20.0 EOSINOPHILIC ESOPHAGITIS: ICD-10-CM

## 2023-11-21 DIAGNOSIS — L65.9 ALOPECIA: ICD-10-CM

## 2023-11-21 DIAGNOSIS — Z00.00 ROUTINE GENERAL MEDICAL EXAMINATION AT A HEALTH CARE FACILITY: Primary | ICD-10-CM

## 2023-11-21 DIAGNOSIS — Z11.59 NEED FOR HEPATITIS C SCREENING TEST: ICD-10-CM

## 2023-11-21 LAB
ALBUMIN SERPL BCG-MCNC: 4.4 G/DL (ref 3.5–5.2)
ALP SERPL-CCNC: 56 U/L (ref 40–150)
ALT SERPL W P-5'-P-CCNC: 47 U/L (ref 0–70)
ANION GAP SERPL CALCULATED.3IONS-SCNC: 9 MMOL/L (ref 7–15)
AST SERPL W P-5'-P-CCNC: 31 U/L (ref 0–45)
BILIRUB SERPL-MCNC: 0.5 MG/DL
BUN SERPL-MCNC: 10.4 MG/DL (ref 6–20)
CALCIUM SERPL-MCNC: 9.5 MG/DL (ref 8.6–10)
CHLORIDE SERPL-SCNC: 104 MMOL/L (ref 98–107)
CHOLEST SERPL-MCNC: 189 MG/DL
CREAT SERPL-MCNC: 1.08 MG/DL (ref 0.67–1.17)
DEPRECATED HCO3 PLAS-SCNC: 26 MMOL/L (ref 22–29)
EGFRCR SERPLBLD CKD-EPI 2021: 82 ML/MIN/1.73M2
ERYTHROCYTE [DISTWIDTH] IN BLOOD BY AUTOMATED COUNT: 12.4 % (ref 10–15)
GLUCOSE SERPL-MCNC: 89 MG/DL (ref 70–99)
HCT VFR BLD AUTO: 45.9 % (ref 40–53)
HCV AB SERPL QL IA: NONREACTIVE
HDLC SERPL-MCNC: 41 MG/DL
HGB BLD-MCNC: 15.1 G/DL (ref 13.3–17.7)
LDLC SERPL CALC-MCNC: 133 MG/DL
MCH RBC QN AUTO: 28.7 PG (ref 26.5–33)
MCHC RBC AUTO-ENTMCNC: 32.9 G/DL (ref 31.5–36.5)
MCV RBC AUTO: 87 FL (ref 78–100)
NONHDLC SERPL-MCNC: 148 MG/DL
PLATELET # BLD AUTO: 194 10E3/UL (ref 150–450)
POTASSIUM SERPL-SCNC: 4.2 MMOL/L (ref 3.4–5.3)
PROT SERPL-MCNC: 7 G/DL (ref 6.4–8.3)
PSA SERPL DL<=0.01 NG/ML-MCNC: 0.2 NG/ML (ref 0–3.5)
RBC # BLD AUTO: 5.27 10E6/UL (ref 4.4–5.9)
SODIUM SERPL-SCNC: 139 MMOL/L (ref 135–145)
TRIGL SERPL-MCNC: 74 MG/DL
WBC # BLD AUTO: 6.4 10E3/UL (ref 4–11)

## 2023-11-21 PROCEDURE — 36415 COLL VENOUS BLD VENIPUNCTURE: CPT | Performed by: INTERNAL MEDICINE

## 2023-11-21 PROCEDURE — G0103 PSA SCREENING: HCPCS | Performed by: INTERNAL MEDICINE

## 2023-11-21 PROCEDURE — 85027 COMPLETE CBC AUTOMATED: CPT | Performed by: INTERNAL MEDICINE

## 2023-11-21 PROCEDURE — 99396 PREV VISIT EST AGE 40-64: CPT | Performed by: INTERNAL MEDICINE

## 2023-11-21 PROCEDURE — 86803 HEPATITIS C AB TEST: CPT | Performed by: INTERNAL MEDICINE

## 2023-11-21 PROCEDURE — 80053 COMPREHEN METABOLIC PANEL: CPT | Performed by: INTERNAL MEDICINE

## 2023-11-21 PROCEDURE — 80061 LIPID PANEL: CPT | Performed by: INTERNAL MEDICINE

## 2023-11-21 RX ORDER — SILDENAFIL 25 MG/1
25 TABLET, FILM COATED ORAL DAILY PRN
Qty: 30 TABLET | Refills: 11 | Status: SHIPPED | OUTPATIENT
Start: 2023-11-21

## 2023-11-21 RX ORDER — FINASTERIDE 1 MG/1
1 TABLET, FILM COATED ORAL EVERY MORNING
Qty: 90 TABLET | Refills: 3 | Status: SHIPPED | OUTPATIENT
Start: 2023-11-21

## 2023-11-21 ASSESSMENT — PAIN SCALES - GENERAL: PAINLEVEL: NO PAIN (0)

## 2023-11-22 NOTE — RESULT ENCOUNTER NOTE
"The following letter pertains to your most recent diagnostic tests:    -Your total cholesterol is 189 which is at your goal of total cholesterol less than 200.    -Your triglycerides are 74 which are at your goal of triglycerides less than 150.    -Your HDL or \"good cholesterol\" is 41 which is at your goal of HDL cholesterol greater than 40.    -Your LDL cholesterol or \"bad cholesterol\" is 133 which is at your goal of LDL cholesterol less than <160.  Your LDL goal is based on your risk factors for artery disease.    -Your prostate specific antigen (PSA) test result returned normal.     -Liver and gallbladder tests are normal for you. (ALT,AST, Alk phos, bilirubin), kidney function is normal for you (Creatinine, GFR), Sodium is normal, Potassium is normal for you, Calcium is normal for you, Glucose (blood sugar) is normal for you.      -Your hepatitis C screening test is negative.  You do not have hepatitis C.     -Your complete blood counts including your hemoglobin returned normal for you.         Bottom line:  These results look essentially stable and at goal for you.        Follow up:  Schedule an appointment for a preventive examination in one year's time, or return sooner if new questions, symptoms or problems arise.        Sincerely,    Dr. Bourgeois    The 10-year ASCVD risk score (Bhargavi KAUFFMAN, et al., 2019) is: 5.4%    Values used to calculate the score:      Age: 53 years      Sex: Male      Is Non- : No      Diabetic: No      Tobacco smoker: No      Systolic Blood Pressure: 130 mmHg      Is BP treated: No      HDL Cholesterol: 41 mg/dL      Total Cholesterol: 189 mg/dL  "

## 2024-11-20 SDOH — HEALTH STABILITY: PHYSICAL HEALTH: ON AVERAGE, HOW MANY MINUTES DO YOU ENGAGE IN EXERCISE AT THIS LEVEL?: 20 MIN

## 2024-11-20 SDOH — HEALTH STABILITY: PHYSICAL HEALTH: ON AVERAGE, HOW MANY DAYS PER WEEK DO YOU ENGAGE IN MODERATE TO STRENUOUS EXERCISE (LIKE A BRISK WALK)?: 2 DAYS

## 2024-11-20 ASSESSMENT — SOCIAL DETERMINANTS OF HEALTH (SDOH): HOW OFTEN DO YOU GET TOGETHER WITH FRIENDS OR RELATIVES?: ONCE A WEEK

## 2024-11-25 ENCOUNTER — OFFICE VISIT (OUTPATIENT)
Dept: FAMILY MEDICINE | Facility: CLINIC | Age: 54
End: 2024-11-25
Payer: COMMERCIAL

## 2024-11-25 VITALS
HEIGHT: 72 IN | SYSTOLIC BLOOD PRESSURE: 115 MMHG | BODY MASS INDEX: 31.11 KG/M2 | RESPIRATION RATE: 18 BRPM | OXYGEN SATURATION: 96 % | TEMPERATURE: 98 F | WEIGHT: 229.7 LBS | DIASTOLIC BLOOD PRESSURE: 80 MMHG | HEART RATE: 86 BPM

## 2024-11-25 DIAGNOSIS — Z86.19 H/O COLD SORES: ICD-10-CM

## 2024-11-25 DIAGNOSIS — L65.9 ALOPECIA: ICD-10-CM

## 2024-11-25 DIAGNOSIS — E78.5 HYPERLIPIDEMIA LDL GOAL <100: ICD-10-CM

## 2024-11-25 DIAGNOSIS — E66.811 CLASS 1 OBESITY WITH SERIOUS COMORBIDITY AND BODY MASS INDEX (BMI) OF 31.0 TO 31.9 IN ADULT, UNSPECIFIED OBESITY TYPE: ICD-10-CM

## 2024-11-25 DIAGNOSIS — Z00.00 ROUTINE GENERAL MEDICAL EXAMINATION AT A HEALTH CARE FACILITY: Primary | ICD-10-CM

## 2024-11-25 DIAGNOSIS — G47.33 OSA (OBSTRUCTIVE SLEEP APNEA): ICD-10-CM

## 2024-11-25 DIAGNOSIS — Z12.5 SCREENING FOR PROSTATE CANCER: ICD-10-CM

## 2024-11-25 DIAGNOSIS — N52.9 MALE ERECTILE DISORDER: ICD-10-CM

## 2024-11-25 DIAGNOSIS — K20.0 EOSINOPHILIC ESOPHAGITIS: ICD-10-CM

## 2024-11-25 LAB
ALBUMIN SERPL BCG-MCNC: 4.3 G/DL (ref 3.5–5.2)
ALP SERPL-CCNC: 62 U/L (ref 40–150)
ALT SERPL W P-5'-P-CCNC: 59 U/L (ref 0–70)
ANION GAP SERPL CALCULATED.3IONS-SCNC: 6 MMOL/L (ref 7–15)
AST SERPL W P-5'-P-CCNC: 32 U/L (ref 0–45)
BILIRUB SERPL-MCNC: 0.4 MG/DL
BUN SERPL-MCNC: 11.1 MG/DL (ref 6–20)
CALCIUM SERPL-MCNC: 9.3 MG/DL (ref 8.8–10.4)
CHLORIDE SERPL-SCNC: 106 MMOL/L (ref 98–107)
CHOLEST SERPL-MCNC: 214 MG/DL
CREAT SERPL-MCNC: 1.02 MG/DL (ref 0.67–1.17)
EGFRCR SERPLBLD CKD-EPI 2021: 87 ML/MIN/1.73M2
ERYTHROCYTE [DISTWIDTH] IN BLOOD BY AUTOMATED COUNT: 12.5 % (ref 10–15)
EST. AVERAGE GLUCOSE BLD GHB EST-MCNC: 100 MG/DL
FASTING STATUS PATIENT QL REPORTED: YES
FASTING STATUS PATIENT QL REPORTED: YES
GLUCOSE SERPL-MCNC: 93 MG/DL (ref 70–99)
HBA1C MFR BLD: 5.1 % (ref 0–5.6)
HCO3 SERPL-SCNC: 27 MMOL/L (ref 22–29)
HCT VFR BLD AUTO: 47.5 % (ref 40–53)
HDLC SERPL-MCNC: 45 MG/DL
HGB BLD-MCNC: 15.6 G/DL (ref 13.3–17.7)
LDLC SERPL CALC-MCNC: 151 MG/DL
MCH RBC QN AUTO: 28.5 PG (ref 26.5–33)
MCHC RBC AUTO-ENTMCNC: 32.8 G/DL (ref 31.5–36.5)
MCV RBC AUTO: 87 FL (ref 78–100)
NONHDLC SERPL-MCNC: 169 MG/DL
PLATELET # BLD AUTO: 190 10E3/UL (ref 150–450)
POTASSIUM SERPL-SCNC: 4.4 MMOL/L (ref 3.4–5.3)
PROT SERPL-MCNC: 6.8 G/DL (ref 6.4–8.3)
PSA SERPL DL<=0.01 NG/ML-MCNC: 0.39 NG/ML (ref 0–3.5)
RBC # BLD AUTO: 5.48 10E6/UL (ref 4.4–5.9)
SODIUM SERPL-SCNC: 139 MMOL/L (ref 135–145)
TRIGL SERPL-MCNC: 88 MG/DL
WBC # BLD AUTO: 7.2 10E3/UL (ref 4–11)

## 2024-11-25 PROCEDURE — 99396 PREV VISIT EST AGE 40-64: CPT | Performed by: INTERNAL MEDICINE

## 2024-11-25 PROCEDURE — 36415 COLL VENOUS BLD VENIPUNCTURE: CPT | Performed by: INTERNAL MEDICINE

## 2024-11-25 PROCEDURE — 80061 LIPID PANEL: CPT | Performed by: INTERNAL MEDICINE

## 2024-11-25 PROCEDURE — 83036 HEMOGLOBIN GLYCOSYLATED A1C: CPT | Performed by: INTERNAL MEDICINE

## 2024-11-25 PROCEDURE — 85027 COMPLETE CBC AUTOMATED: CPT | Performed by: INTERNAL MEDICINE

## 2024-11-25 PROCEDURE — G0103 PSA SCREENING: HCPCS | Performed by: INTERNAL MEDICINE

## 2024-11-25 PROCEDURE — 80053 COMPREHEN METABOLIC PANEL: CPT | Performed by: INTERNAL MEDICINE

## 2024-11-25 RX ORDER — FINASTERIDE 1 MG/1
1 TABLET, FILM COATED ORAL EVERY MORNING
Qty: 90 TABLET | Refills: 3 | Status: SHIPPED | OUTPATIENT
Start: 2024-11-25

## 2024-11-25 RX ORDER — VALACYCLOVIR HYDROCHLORIDE 1 G/1
1000 TABLET, FILM COATED ORAL 2 TIMES DAILY
Qty: 30 TABLET | Refills: 3 | Status: SHIPPED | OUTPATIENT
Start: 2024-11-25

## 2024-11-25 RX ORDER — SILDENAFIL 25 MG/1
25 TABLET, FILM COATED ORAL DAILY PRN
Qty: 30 TABLET | Refills: 11 | Status: SHIPPED | OUTPATIENT
Start: 2024-11-25

## 2024-11-25 ASSESSMENT — PAIN SCALES - GENERAL: PAINLEVEL_OUTOF10: NO PAIN (0)

## 2024-11-25 NOTE — PATIENT INSTRUCTIONS
Patient Education   Preventive Care Advice   This is general advice given by our system to help you stay healthy. However, your care team may have specific advice just for you. Please talk to your care team about your preventive care needs.  Nutrition  Eat 5 or more servings of fruits and vegetables each day.  Try wheat bread, brown rice and whole grain pasta (instead of white bread, rice, and pasta).  Get enough calcium and vitamin D. Check the label on foods and aim for 100% of the RDA (recommended daily allowance).  Lifestyle  Exercise at least 150 minutes each week  (30 minutes a day, 5 days a week).  Do muscle strengthening activities 2 days a week. These help control your weight and prevent disease.  No smoking.  Wear sunscreen to prevent skin cancer.  Have a dental exam and cleaning every 6 months.  Yearly exams  See your health care team every year to talk about:  Any changes in your health.  Any medicines your care team has prescribed.  Preventive care, family planning, and ways to prevent chronic diseases.  Shots (vaccines)   HPV shots (up to age 26), if you've never had them before.  Hepatitis B shots (up to age 59), if you've never had them before.  COVID-19 shot: Get this shot when it's due.  Flu shot: Get a flu shot every year.  Tetanus shot: Get a tetanus shot every 10 years.  Pneumococcal, hepatitis A, and RSV shots: Ask your care team if you need these based on your risk.  Shingles shot (for age 50 and up)  General health tests  Diabetes screening:  Starting at age 35, Get screened for diabetes at least every 3 years.  If you are younger than age 35, ask your care team if you should be screened for diabetes.  Cholesterol test: At age 39, start having a cholesterol test every 5 years, or more often if advised.  Bone density scan (DEXA): At age 50, ask your care team if you should have this scan for osteoporosis (brittle bones).  Hepatitis C: Get tested at least once in your life.  STIs (sexually  transmitted infections)  Before age 24: Ask your care team if you should be screened for STIs.  After age 24: Get screened for STIs if you're at risk. You are at risk for STIs (including HIV) if:  You are sexually active with more than one person.  You don't use condoms every time.  You or a partner was diagnosed with a sexually transmitted infection.  If you are at risk for HIV, ask about PrEP medicine to prevent HIV.  Get tested for HIV at least once in your life, whether you are at risk for HIV or not.  Cancer screening tests  Cervical cancer screening: If you have a cervix, begin getting regular cervical cancer screening tests starting at age 21.  Breast cancer scan (mammogram): If you've ever had breasts, begin having regular mammograms starting at age 40. This is a scan to check for breast cancer.  Colon cancer screening: It is important to start screening for colon cancer at age 45.  Have a colonoscopy test every 10 years (or more often if you're at risk) Or, ask your provider about stool tests like a FIT test every year or Cologuard test every 3 years.  To learn more about your testing options, visit:   .  For help making a decision, visit:   https://bit.ly/sv80181.  Prostate cancer screening test: If you have a prostate, ask your care team if a prostate cancer screening test (PSA) at age 55 is right for you.  Lung cancer screening: If you are a current or former smoker ages 50 to 80, ask your care team if ongoing lung cancer screenings are right for you.  For informational purposes only. Not to replace the advice of your health care provider. Copyright   2023 [place] [employer]. All rights reserved. Clinically reviewed by the [facility]itions [facility]. "[employer] 125332 - REV 01/24.

## 2024-11-25 NOTE — RESULT ENCOUNTER NOTE
The following letter pertains to your most recent diagnostic tests:    -The cholesterol has unfortunately increased since last check.    -The metabolic panel looks stable.    -Your prostate specific antigen (PSA) test result returned normal.     -Your hemoglobin A1c test which averages your blood sugars over the last 3 months returned normal.  No evidence for diabetes or prediabetes.       -Your complete blood counts including your hemoglobin returned normal for you.           Bottom line: Since the cholesterol has worsened slightly, I recommend proceeding with the CT coronary artery calcium score that we discussed in clinic.  Please call [facility] at [phone] to schedule a CT coronary calcium score.      The other labs look okay for you.      Sincerely,    Dr. Bourgeois    The 10-year ASCVD risk score (Bhargavi KAUFFMAN, et al., 2019) is: 5.1%

## 2024-11-25 NOTE — PROGRESS NOTES
Preventive Care Visit  [facility]  Mars Bourgeois MD, Internal Medicine  Nov 25, 2024      Assessment & Plan     Routine general medical examination at a health care facility    - Lipid panel reflex to direct LDL Fasting; Future  - Comprehensive metabolic panel; Future  - CBC with platelets; Future  - HEMOGLOBIN A1C; Future    Alopecia    - finasteride (PROPECIA) 1 MG tablet; Take 1 tablet (1 mg) by mouth every morning.    Eosinophilic esophagitis    - omeprazole (PRILOSEC) 20 MG DR capsule; Take 1 capsule (20 mg) by mouth daily.    Male erectile disorder    - sildenafil (VIAGRA) 25 MG tablet; Take 1 tablet (25 mg) by mouth daily as needed.    H/O cold sores    - valACYclovir (VALTREX) 1000 mg tablet; Take 1 tablet (1,000 mg) by mouth 2 times daily.    Class 1 obesity with serious comorbidity and body mass index (BMI) of 31.0 to 31.9 in adult, unspecified obesity type  Counseled on diet and exercise interventions to promote weight loss     SVEN (obstructive sleep apnea)  On CPAP     Screening for prostate cancer    - PROSTATE SPEC ANTIGEN SCREEN; Future    Patient has been advised of split billing requirements and indicates understanding: Yes    Wt Readings from Last 4 Encounters:   11/25/24 104.2 kg (229 lb 11.2 oz)   11/21/23 98 kg (216 lb)   08/28/23 100.7 kg (222 lb)   05/26/23 98 kg (216 lb)         BMI  Estimated body mass index is 31.15 kg/m  as calculated from the following:    Height as of this encounter: 1.829 m (6').    Weight as of this encounter: 104.2 kg (229 lb 11.2 oz).   Weight management plan: Discussed healthy diet and exercise guidelines    Counseling  Appropriate preventive services were addressed with this patient via screening, questionnaire, or discussion as appropriate for fall prevention, nutrition, physical activity, Tobacco-use cessation, social engagement, weight loss and cognition.  Checklist reviewing preventive services available has been given to the  patient.  Reviewed patient's diet, addressing concerns and/or questions.   He is at risk for lack of exercise and has been provided with information to increase physical activity for the benefit of his well-being.       FUTURE APPOINTMENTS:       - Follow-up for annual visit or as needed    Elly Vance is a 54 year old, presenting for the following:  Physical (Fasting )        11/25/2024     8:05 AM   Additional Questions   Roomed by george MCNEAL          Health Care Directive  Patient does not have a Health Care Directive: Discussed advance care planning with patient; however, patient declined at this time.      11/20/2024   General Health   How would you rate your overall physical health? Good   Feel stress (tense, anxious, or unable to sleep) Only a little      (!) STRESS CONCERN      11/20/2024   Nutrition   Three or more servings of calcium each day? Yes   Diet: Regular (no restrictions)   How many servings of fruit and vegetables per day? (!) 2-3   How many sweetened beverages each day? 0-1            11/20/2024   Exercise   Days per week of moderate/strenous exercise 2 days   Average minutes spent exercising at this level 20 min      (!) EXERCISE CONCERN      11/20/2024   Social Factors   Frequency of gathering with friends or relatives Once a week   Worry food won't last until get money to buy more No   Food not last or not have enough money for food? No   Do you have housing? (Housing is defined as stable permanent housing and does not include staying ouside in a car, in a tent, in an abandoned building, in an overnight shelter, or couch-surfing.) No   Are you worried about losing your housing? No   Lack of transportation? No   Unable to get utilities (heat,electricity)? No   Want help with housing or utility concern? No      (!) HOUSING CONCERN PRESENT      11/20/2024   Fall Risk   Fallen 2 or more times in the past year? No    Trouble with walking or balance? No        Patient-reported           11/20/2024   Dental   Dentist two times every year? Yes            11/20/2024   TB Screening   Were you born outside of the [place]? No            Today's PHQ-2 Score:       11/25/2024     7:51 AM   PHQ-2 ( 1999 [employer])   Q1: Little interest or pleasure in doing things 0    Q2: Feeling down, depressed or hopeless 0    PHQ-2 Score 0    Q1: Little interest or pleasure in doing things Not at all   Q2: Feeling down, depressed or hopeless Not at all   PHQ-2 Score 0       Patient-reported           11/20/2024   Substance Use   Alcohol more than 3/day or more than 7/wk Not Applicable   Do you use any other substances recreationally? No        Social History     Tobacco Use    Smoking status: Never    Smokeless tobacco: Never   Vaping Use    Vaping status: Never Used   Substance Use Topics    Alcohol use: No    Drug use: No           11/20/2024   STI Screening   New sexual partner(s) since last STI/HIV test? No      ASCVD Risk   The 10-year ASCVD risk score (Bhargavi KAUFFMAN, et al., 2019) is: 4.8%    Values used to calculate the score:      Age: 54 years      Sex: Male      Is Non- : No      Diabetic: No      Tobacco smoker: No      Systolic Blood Pressure: 115 mmHg      Is BP treated: No      HDL Cholesterol: 41 mg/dL      Total Cholesterol: 189 mg/dL           Reviewed and updated as needed this visit by Provider                    Past Medical History:   Diagnosis Date    SVEN (obstructive sleep apnea) 7/7/2017     Past Surgical History:   Procedure Laterality Date    COLONOSCOPY N/A 5/26/2023    Procedure: Colonoscopy;  Surgeon: Ravi Chinchilla MD;  Location:  GI    ESOPHAGOSCOPY, GASTROSCOPY, DUODENOSCOPY (EGD), COMBINED N/A 5/26/2023    Procedure: Esophagoscopy, gastroscopy, duodenoscopy (EGD), combined;  Surgeon: Ravi Chinchilla MD;  Location:  GI    EYE SURGERY      prk    NO HISTORY OF SURGERY      wisdom teeth       BP Readings from Last 3 Encounters:   11/25/24 115/80   11/21/23  130/84   08/28/23 118/79    Wt Readings from Last 3 Encounters:   11/25/24 104.2 kg (229 lb 11.2 oz)   11/21/23 98 kg (216 lb)   08/28/23 100.7 kg (222 lb)                  Patient Active Problem List   Diagnosis    SVEN (obstructive sleep apnea)    Eosinophilic esophagitis    Alopecia    H/O cold sores     Past Surgical History:   Procedure Laterality Date    COLONOSCOPY N/A 5/26/2023    Procedure: Colonoscopy;  Surgeon: Ravi Chinchilla MD;  Location:  GI    ESOPHAGOSCOPY, GASTROSCOPY, DUODENOSCOPY (EGD), COMBINED N/A 5/26/2023    Procedure: Esophagoscopy, gastroscopy, duodenoscopy (EGD), combined;  Surgeon: Ravi Chinchilla MD;  Location:  GI    EYE SURGERY      prk    NO HISTORY OF SURGERY      wisdom teeth         Social History     Tobacco Use    Smoking status: Never    Smokeless tobacco: Never   Substance Use Topics    Alcohol use: No     Family History   Problem Relation Age of Onset    Vascular Disease Mother         smoker     Thyroid Disease Mother     Coronary Artery Disease Mother     Lung Cancer Father     Other Cancer Father         He smoked    Sleep Apnea Brother     Sleep Apnea Brother          Current Outpatient Medications   Medication Sig Dispense Refill    finasteride (PROPECIA) 1 MG tablet Take 1 tablet (1 mg) by mouth every morning 90 tablet 3    omeprazole (PRILOSEC) 20 MG DR capsule Take 1 capsule (20 mg) by mouth daily 90 capsule 3    sildenafil (VIAGRA) 25 MG tablet Take 1 tablet (25 mg) by mouth daily as needed 30 tablet 11    sildenafil (VIAGRA) 25 MG tablet TAKE ONE TABLET BY MOUTH DAILY AS NEEDED 12 tablet 0    valACYclovir (VALTREX) 1000 mg tablet Take 1 tablet (1,000 mg) by mouth 2 times daily 30 tablet 3       A 10 organ systems ROS is negative other than any pertinent positives or negatives previously stated.      Objective    Exam  /80 (BP Location: Right arm, Patient Position: Sitting, Cuff Size: Adult Large)   Pulse 86   Temp 98  F (36.7  C) (Temporal)   Resp 18    Ht 1.829 m (6')   Wt 104.2 kg (229 lb 11.2 oz)   SpO2 96%   BMI 31.15 kg/m     Estimated body mass index is 31.15 kg/m  as calculated from the following:    Height as of this encounter: 1.829 m (6').    Weight as of this encounter: 104.2 kg (229 lb 11.2 oz).    Physical Exam  GENERAL: alert and no distress  EYES: Eyes grossly normal to inspection, PERRL and conjunctivae and sclerae normal  HENT: ear canals and TM's normal, nose and mouth without ulcers or lesions  NECK: no adenopathy, no asymmetry, masses, or scars  RESP: lungs clear to auscultation - no rales, rhonchi or wheezes  CV: regular rate and rhythm, normal S1 S2, no S3 or S4, no murmur, click or rub, no peripheral edema  ABDOMEN: soft, nontender, no hepatosplenomegaly, no masses and bowel sounds normal  MS: no gross musculoskeletal defects noted, no edema  SKIN: no suspicious lesions or rashes  NEURO: Normal strength and tone, mentation intact and speech normal  PSYCH: mentation appears normal, affect normal/bright        Signed Electronically by: Mars Bourgeois MD

## 2025-03-10 ENCOUNTER — HOSPITAL ENCOUNTER (OUTPATIENT)
Dept: CARDIOLOGY | Facility: CLINIC | Age: 55
Discharge: HOME | End: 2025-03-10
Attending: INTERNAL MEDICINE | Admitting: INTERNAL MEDICINE
Payer: COMMERCIAL

## 2025-03-10 DIAGNOSIS — E78.5 HYPERLIPIDEMIA LDL GOAL <100: ICD-10-CM

## 2025-03-10 PROCEDURE — 75571 CT HRT W/O DYE W/CA TEST: CPT

## 2025-03-10 PROCEDURE — 75571 CT HRT W/O DYE W/CA TEST: CPT | Mod: 26 | Performed by: INTERNAL MEDICINE

## (undated) RX ORDER — FENTANYL CITRATE 50 UG/ML
INJECTION, SOLUTION INTRAMUSCULAR; INTRAVENOUS
Status: DISPENSED
Start: 2023-05-26